# Patient Record
Sex: MALE | Race: WHITE | Employment: FULL TIME | URBAN - METROPOLITAN AREA
[De-identification: names, ages, dates, MRNs, and addresses within clinical notes are randomized per-mention and may not be internally consistent; named-entity substitution may affect disease eponyms.]

---

## 2017-01-17 RX ORDER — ATENOLOL 50 MG/1
TABLET ORAL
Qty: 14 TABLET | Refills: 0 | Status: SHIPPED | OUTPATIENT
Start: 2017-01-17

## 2017-11-02 LAB
AVERAGE GLUCOSE: 153
HBA1C MFR BLD: 8.5 %
TSH SERPL DL<=0.05 MIU/L-ACNC: 0.78 UIU/ML

## 2017-11-06 ENCOUNTER — OFFICE VISIT (OUTPATIENT)
Dept: FAMILY MEDICINE CLINIC | Age: 47
End: 2017-11-06
Payer: COMMERCIAL

## 2017-11-06 VITALS
TEMPERATURE: 98.5 F | BODY MASS INDEX: 34.02 KG/M2 | HEIGHT: 71 IN | SYSTOLIC BLOOD PRESSURE: 128 MMHG | OXYGEN SATURATION: 98 % | DIASTOLIC BLOOD PRESSURE: 74 MMHG | HEART RATE: 77 BPM | WEIGHT: 243 LBS

## 2017-11-06 DIAGNOSIS — R21 RASH AND NONSPECIFIC SKIN ERUPTION: ICD-10-CM

## 2017-11-06 DIAGNOSIS — T14.8XXA ABRASION OF SKIN: Primary | ICD-10-CM

## 2017-11-06 DIAGNOSIS — E11.9 TYPE 2 DIABETES MELLITUS WITHOUT COMPLICATION, WITHOUT LONG-TERM CURRENT USE OF INSULIN (HCC): ICD-10-CM

## 2017-11-06 DIAGNOSIS — L03.114 CELLULITIS OF LEFT UPPER EXTREMITY: ICD-10-CM

## 2017-11-06 LAB
CREATININE URINE: 112.85 MG/DL
MICROALBUMIN/CREAT 24H UR: <0.7 MG/G{CREAT}

## 2017-11-06 PROCEDURE — 99214 OFFICE O/P EST MOD 30 MIN: CPT | Performed by: NURSE PRACTITIONER

## 2017-11-06 RX ORDER — DOXYCYCLINE HYCLATE 100 MG
100 TABLET ORAL 2 TIMES DAILY
Qty: 20 TABLET | Refills: 0 | Status: SHIPPED | OUTPATIENT
Start: 2017-11-06 | End: 2017-11-16

## 2017-11-06 RX ORDER — HYDROXYZINE HYDROCHLORIDE 25 MG/1
25 TABLET, FILM COATED ORAL 3 TIMES DAILY PRN
Qty: 30 TABLET | Refills: 0 | Status: SHIPPED | OUTPATIENT
Start: 2017-11-06 | End: 2017-11-16

## 2017-11-06 ASSESSMENT — PATIENT HEALTH QUESTIONNAIRE - PHQ9
2. FEELING DOWN, DEPRESSED OR HOPELESS: 0
SUM OF ALL RESPONSES TO PHQ9 QUESTIONS 1 & 2: 0
1. LITTLE INTEREST OR PLEASURE IN DOING THINGS: 0
SUM OF ALL RESPONSES TO PHQ QUESTIONS 1-9: 0

## 2017-11-06 NOTE — PROGRESS NOTES
Subjective:      Patient ID: Chloe Stearns is a 52 y.o. male. HPI  9year old white male presents with followin. Abrasion /cellulitis- left hand for 2 days. Was working on a machine Saturday and injured left hand. Has an abrasion in left hand which is getting more painful, red and swelling over the past 2 days. Td is current. 2. DM- currently is on insulin managed by Dr. Mary Ho and recent A1c was over 8. Denies paresthesias sores ulcers in BLEs. 3. Rash- in bilateral thighs for 2 weeks. it is very itchy. Pt states he hasn't changed soaps detergents or skin care products. denies recent fever chills cough or nasal congestion. The only thing he could think of is that he started new shorts recently. Review of Systems   Constitutional: Negative for chills and fever. Respiratory: Negative for cough and shortness of breath. Cardiovascular: Negative for chest pain. Gastrointestinal: Negative for abdominal pain and nausea. Skin: Positive for rash (in thighs) and wound (in left hand). Neurological: Negative for dizziness, weakness and numbness. Objective:   Physical Exam   Constitutional: He appears well-nourished. No distress. obesity   HENT:   Nose: Nose normal.   Mouth/Throat: Oropharynx is clear and moist.   Eyes: Conjunctivae are normal.   Neck: Neck supple. Cardiovascular: Normal rate, regular rhythm and normal heart sounds. Pulmonary/Chest: Effort normal and breath sounds normal. No respiratory distress. Abdominal: Soft. There is no tenderness. Musculoskeletal: He exhibits no edema or tenderness. Lymphadenopathy:     He has no cervical adenopathy. Neurological: He is alert. No cranial nerve deficit. Foot exam: no sores ulcers in bilateral feet. Monofilament test normal bilaterally. Skin:        Psychiatric: He has a normal mood and affect. His behavior is normal.   Nursing note and vitals reviewed. Assessment:      1. Abrasion of skin    2. Cellulitis of left upper extremity    3. Type 2 diabetes mellitus without complication, without long-term current use of insulin ([facility] [address]    4. Rash and nonspecific skin eruption            Plan:      BP Readings from Last 3 Encounters:   11/06/17 128/74   06/21/16 118/72   01/30/15 (!) 140/108     /74 (Site: Right Arm, Position: Sitting, Cuff Size: Large Adult)   Pulse 77   Temp 98.5 °F (36.9 °C) (Oral)   Ht 5' 11\" (1.803 m)   Wt 243 lb (110.2 kg)   SpO2 98%   BMI 33.89 kg/m²   Lab Results   Component Value Date    CHOL 196 01/31/2015    TRIG 165 01/31/2015    HDL 29 (A) 01/31/2015    LABA1C 8.3 (H) 12/16/2011     No results found for: CALCIUM, PHOS  Lab Results   Component Value Date    LDLCALC 134 01/31/2015         1. Abrasion of skin/2. Cellulitis of left upper extremity  - doxycycline hyclate (VIBRA-TABS) 100 MG tablet; Take 1 tablet by mouth 2 times daily for 10 days  Dispense: 20 tablet; Refill: 0  - wound care instructions provided. Enc to call the office if symptoms worsen. 3. Type 2 diabetes mellitus without complication, without long-term current use of insulin ([address]  - obtain A1c result from Endo  - Microalbumin, Ur; Future  -  DIABETES FOOT EXAM    4. Rash and nonspecific skin eruption - likely allergic to shorts  - hydrOXYzine (ATARAX) 25 MG tablet; Take 1 tablet by mouth 3 times daily as needed for Itching  Dispense: 30 tablet; Refill: 0  - advised to wear shorts made of cotton. - follow up in 2 weeks if rash is not improving. Requested Prescriptions     Signed Prescriptions Disp Refills    doxycycline hyclate (VIBRA-TABS) 100 MG tablet 20 tablet 0     Sig: Take 1 tablet by mouth 2 times daily for 10 days    hydrOXYzine (ATARAX) 25 MG tablet 30 tablet 0     Sig: Take 1 tablet by mouth 3 times daily as needed for Itching       There are no discontinued medications.     Diana Lombardi received counseling on the following healthy behaviors: nutrition, exercise and weight reduction  Reviewed prior labs and health maintenance. Continue current medications, diet and exercise. Discussed use, benefit, and side effects of prescribed medications. Barriers to medication compliance addressed. Patient given educational materials - see patient instructions. All patient questions answered. Patient voiced understanding.

## 2017-11-07 DIAGNOSIS — E03.9 HYPOTHYROIDISM, UNSPECIFIED TYPE: ICD-10-CM

## 2017-11-07 DIAGNOSIS — E11.9 TYPE 2 DIABETES MELLITUS WITHOUT COMPLICATION, WITHOUT LONG-TERM CURRENT USE OF INSULIN (HCC): Primary | ICD-10-CM

## 2017-11-07 DIAGNOSIS — E11.9 TYPE 2 DIABETES MELLITUS WITHOUT COMPLICATION, WITHOUT LONG-TERM CURRENT USE OF INSULIN (HCC): ICD-10-CM

## 2017-11-07 ASSESSMENT — ENCOUNTER SYMPTOMS
ABDOMINAL PAIN: 0
NAUSEA: 0
COUGH: 0
SHORTNESS OF BREATH: 0

## 2017-12-28 ENCOUNTER — OFFICE VISIT (OUTPATIENT)
Dept: FAMILY MEDICINE CLINIC | Age: 47
End: 2017-12-28
Payer: COMMERCIAL

## 2017-12-28 VITALS
DIASTOLIC BLOOD PRESSURE: 83 MMHG | OXYGEN SATURATION: 97 % | BODY MASS INDEX: 35.62 KG/M2 | TEMPERATURE: 100.9 F | WEIGHT: 263 LBS | SYSTOLIC BLOOD PRESSURE: 127 MMHG | HEIGHT: 72 IN | HEART RATE: 99 BPM

## 2017-12-28 DIAGNOSIS — J01.90 ACUTE SINUSITIS, RECURRENCE NOT SPECIFIED, UNSPECIFIED LOCATION: Primary | ICD-10-CM

## 2017-12-28 DIAGNOSIS — J20.9 ACUTE BRONCHITIS, UNSPECIFIED ORGANISM: ICD-10-CM

## 2017-12-28 DIAGNOSIS — J45.21 MILD INTERMITTENT REACTIVE AIRWAY DISEASE WITH ACUTE EXACERBATION: ICD-10-CM

## 2017-12-28 LAB
INFLUENZA A ANTIBODY: NEGATIVE
INFLUENZA B ANTIBODY: NEGATIVE

## 2017-12-28 PROCEDURE — 87804 INFLUENZA ASSAY W/OPTIC: CPT | Performed by: FAMILY MEDICINE

## 2017-12-28 PROCEDURE — 99213 OFFICE O/P EST LOW 20 MIN: CPT | Performed by: FAMILY MEDICINE

## 2017-12-28 RX ORDER — AMOXICILLIN AND CLAVULANATE POTASSIUM 875; 125 MG/1; MG/1
1 TABLET, FILM COATED ORAL 2 TIMES DAILY
Qty: 20 TABLET | Refills: 0 | Status: SHIPPED | OUTPATIENT
Start: 2017-12-28 | End: 2018-01-07

## 2017-12-28 RX ORDER — PREDNISONE 20 MG/1
20 TABLET ORAL 2 TIMES DAILY
Qty: 10 TABLET | Refills: 0 | Status: SHIPPED | OUTPATIENT
Start: 2017-12-28 | End: 2018-01-02

## 2017-12-28 RX ORDER — ALBUTEROL SULFATE 2.5 MG/3ML
2.5 SOLUTION RESPIRATORY (INHALATION) EVERY 6 HOURS PRN
Qty: 1 PACKAGE | Refills: 0 | Status: SHIPPED | OUTPATIENT
Start: 2017-12-28 | End: 2021-08-18

## 2017-12-28 RX ORDER — BENZONATATE 100 MG/1
100 CAPSULE ORAL 3 TIMES DAILY PRN
Qty: 25 CAPSULE | Refills: 0 | Status: SHIPPED | OUTPATIENT
Start: 2017-12-28 | End: 2018-01-22

## 2017-12-28 RX ORDER — VALSARTAN AND HYDROCHLOROTHIAZIDE 320; 12.5 MG/1; MG/1
1 TABLET, FILM COATED ORAL DAILY
COMMUNITY
Start: 2017-12-04 | End: 2021-01-25

## 2017-12-28 RX ORDER — ALBUTEROL SULFATE 90 UG/1
2 AEROSOL, METERED RESPIRATORY (INHALATION) EVERY 6 HOURS PRN
Qty: 1 INHALER | Refills: 0 | Status: SHIPPED | OUTPATIENT
Start: 2017-12-28 | End: 2021-08-18

## 2017-12-28 ASSESSMENT — ENCOUNTER SYMPTOMS
VOICE CHANGE: 0
SHORTNESS OF BREATH: 1
BACK PAIN: 0
RHINORRHEA: 1
CHEST TIGHTNESS: 0
HEMOPTYSIS: 0
SINUS PRESSURE: 1
VOMITING: 0
DIARRHEA: 1
ABDOMINAL PAIN: 0
SINUS PAIN: 1
NAUSEA: 0
TROUBLE SWALLOWING: 1
COUGH: 1
EYE REDNESS: 0
EYE DISCHARGE: 0
WHEEZING: 1
CONSTIPATION: 0
SORE THROAT: 1
EYE ITCHING: 0

## 2017-12-28 NOTE — PROGRESS NOTES
[facility]   [address] [facility]  [address] [facility] [address]  Dept: [phone]  Dept Fax: [phone]    Miguel Angel Funes is a 52 y.o. male who presents today for his medical conditions/complaints as noted below. Miguel Angel Funes is c/o of Cough (fever, body aches - started on Monday)        HPI:     Cough   This is a new problem. The current episode started in the past 7 days (5 days). The problem has been unchanged. The problem occurs every few minutes. The cough is productive of sputum. Associated symptoms include chills, ear pain, a fever, headaches, myalgias, nasal congestion, postnasal drip, rhinorrhea, a sore throat, shortness of breath, sweats and wheezing. Pertinent negatives include no chest pain, eye redness, hemoptysis or rash. Associated symptoms comments: Ribs hurt from coughing. Sinus headaches. . The symptoms are aggravated by lying down. Treatments tried: nyquil mucinex robitussin. The treatment provided mild relief. His past medical history is significant for environmental allergies. There is no history of asthma, bronchitis or pneumonia.  RAD       Past Medical History:   Diagnosis Date    Anxiety 6/6/2013    Depression 6/6/2013    DM (diabetes mellitus) ([facility] [address] 6/6/2013    ED (erectile dysfunction) 6/6/2013    Elevated ALT measurement 6/6/2013    GERD (gastroesophageal reflux disease) 6/6/2013    HTN (hypertension) 6/6/2013    Hyperlipidemia 6/6/2013    Hypothyroidism 6/6/2013    Obesity 6/6/2013    RAD (reactive airway disease) 6/6/2013      Past Surgical History:   Procedure Laterality Date    CARDIAC CATHETERIZATION      TONSILLECTOMY         Family History   Problem Relation Age of Onset    Diabetes Other     Heart Disease Other        Social History   Substance Use Topics    Smoking status: Never Smoker    Smokeless tobacco: Never Used    Alcohol use Yes      Comment: rarely      Current Outpatient Prescriptions   Medication Sig Dispense Refill    valsartan-hydrochlorothiazide (DIOVAN-HCT) 320-12.5 MG per tablet Take 1 tablet by mouth daily      amoxicillin-clavulanate (AUGMENTIN) 875-125 MG per tablet Take 1 tablet by mouth 2 times daily for 10 days 20 tablet 0    albuterol sulfate  (90 Base) MCG/ACT inhaler Inhale 2 puffs into the lungs every 6 hours as needed for Wheezing 1 Inhaler 0    albuterol (PROVENTIL) (2.5 MG/3ML) 0.083% nebulizer solution Take 3 mLs by nebulization every 6 hours as needed for Wheezing 1 Package 0    predniSONE (DELTASONE) 20 MG tablet Take 1 tablet by mouth 2 times daily for 5 days Take one tablet twice a day with food for 5 days 10 tablet 0    benzonatate (TESSALON PERLES) 100 MG capsule Take 1 capsule by mouth 3 times daily as needed for Cough 25 capsule 0    atenolol (TENORMIN) 50 MG tablet TAKE ONE TABLET BY MOUTH DAILY 14 tablet 0    Insulin Glargine (LANTUS SC) Inject  into the skin. Endo. Dr Anisha Champagne      insulin lispro (HUMALOG) 100 UNIT/ML injection Inject  into the skin 3 times daily (before meals). Endo      metformin (GLUCOPHAGE) 1000 MG tablet Take 1,000 mg by mouth 2 times daily (with meals).  levothyroxine (SYNTHROID) 200 MCG tablet Take 200 mcg by mouth Daily.  ibuprofen (ADVIL;MOTRIN) 800 MG tablet Take 1 tablet by mouth 3 times daily as needed for Pain (Take with food) 30 tablet 0    lovastatin (MEVACOR) 40 MG tablet       B-D UF III MINI PEN NEEDLES 31G X 5 MM MISC       FARXIGA 10 MG tablet       valsartan (DIOVAN) 320 MG tablet Take 320 mg by mouth daily. No current facility-administered medications for this visit.       Allergies   Allergen Reactions    Antihistamines, Loratadine-Type [Antihistamines, Loratadine-Type]     Fenofibrate     Quinapril Hcl        Health Maintenance   Topic Date Due    HIV screen  03/26/1985    Pneumococcal med risk (1 of 1 - PPSV23) 03/26/1989    Lipid screen  01/31/2016    Flu vaccine (1) 09/01/2017    Diabetic retinal exam  09/20/2018    Diabetic hemoglobin A1C test  11/02/2018    Diabetic foot exam  11/06/2018    Diabetic microalbuminuria test  11/06/2018    DTaP/Tdap/Td vaccine (2 - Td) 07/28/2024       Subjective:      Review of Systems   Constitutional: Positive for appetite change, chills, diaphoresis, fatigue and fever. Negative for activity change. HENT: Positive for congestion, ear pain, postnasal drip, rhinorrhea, sinus pain, sinus pressure, sneezing, sore throat and trouble swallowing. Negative for ear discharge, hearing loss and voice change. Eyes: Negative for discharge, redness, itching and visual disturbance. Respiratory: Positive for cough, shortness of breath and wheezing. Negative for hemoptysis and chest tightness. Cardiovascular: Negative for chest pain. Gastrointestinal: Positive for diarrhea. Negative for abdominal pain, constipation, nausea and vomiting. Mild diarrhea   Genitourinary: Negative for dysuria, flank pain and frequency. Urine darker than usual.   Musculoskeletal: Positive for myalgias. Negative for back pain, neck pain and neck stiffness. Skin: Negative for rash. Allergic/Immunologic: Positive for environmental allergies. Neurological: Positive for headaches. Negative for dizziness, weakness and light-headedness. Hematological: Negative for adenopathy. Psychiatric/Behavioral: The patient is not nervous/anxious. Objective:     Physical Exam   Constitutional: He is oriented to person, place, and time. He appears well-developed and well-nourished. No distress. HENT:   Head: Normocephalic. Right Ear: External ear normal. Tympanic membrane is erythematous. A middle ear effusion is present. Left Ear: External ear normal. A middle ear effusion is present. Nose: Mucosal edema and rhinorrhea present. Mouth/Throat: Uvula is midline and mucous membranes are normal. No uvula swelling. Posterior oropharyngeal erythema present.  No

## 2017-12-28 NOTE — PATIENT INSTRUCTIONS
Patient Education        Sinusitis: Care Instructions  Your Care Instructions    Sinusitis is an infection of the lining of the sinus cavities in your head. Sinusitis often follows a cold. It causes pain and pressure in your head and face. In most cases, sinusitis gets better on its own in 1 to 2 weeks. But some mild symptoms may last for several weeks. Sometimes antibiotics are needed. Follow-up care is a key part of your treatment and safety. Be sure to make and go to all appointments, and call your doctor if you are having problems. It's also a good idea to know your test results and keep a list of the medicines you take. How can you care for yourself at home? · Take an over-the-counter pain medicine, such as acetaminophen (Tylenol), ibuprofen (Advil, Motrin), or naproxen (Aleve). Read and follow all instructions on the label. · If the doctor prescribed antibiotics, take them as directed. Do not stop taking them just because you feel better. You need to take the full course of antibiotics. · Be careful when taking over-the-counter cold or flu medicines and Tylenol at the same time. Many of these medicines have acetaminophen, which is Tylenol. Read the labels to make sure that you are not taking more than the recommended dose. Too much acetaminophen (Tylenol) can be harmful. · Breathe warm, moist air from a steamy shower, a hot bath, or a sink filled with hot water. Avoid cold, dry air. Using a humidifier in your home may help. Follow the directions for cleaning the machine. · Use saline (saltwater) nasal washes to help keep your nasal passages open and wash out mucus and bacteria. You can buy saline nose drops at a grocery store or drugstore. Or you can make your own at home by adding 1 teaspoon of salt and 1 teaspoon of baking soda to 2 cups of distilled water. If you make your own, fill a bulb syringe with the solution, insert the tip into your nostril, and squeeze gently. Liz Sine your nose.   · Put a hot, wet towel or a warm gel pack on your face 3 or 4 times a day for 5 to 10 minutes each time. · Try a decongestant nasal spray like oxymetazoline (Afrin). Do not use it for more than 3 days in a row. Using it for more than 3 days can make your congestion worse. When should you call for help? Call your doctor now or seek immediate medical care if:  ? · You have new or worse swelling or redness in your face or around your eyes. ? · You have a new or higher fever. ? Watch closely for changes in your health, and be sure to contact your doctor if:  ? · You have new or worse facial pain. ? · The mucus from your nose becomes thicker (like pus) or has new blood in it. ? · You are not getting better as expected. Where can you learn more? Go to https://simpleFLOORSpepiceweb.Jamn. org and sign in to your Marriage.com account. Enter D669 in the Go800 box to learn more about \"Sinusitis: Care Instructions. \"     If you do not have an account, please click on the \"Sign Up Now\" link. Current as of: May 12, 2017  Content Version: 11.4  © 9267-5845 [employer]. Care instructions adapted under license by [facility] ([facility]). If you have questions about a medical condition or this instruction, always ask your healthcare professional. [place] [address] any warranty or liability for your use of this information. Patient Education        Bronchitis: Care Instructions  Your Care Instructions    Bronchitis is inflammation of the bronchial tubes, which carry air to the lungs. The tubes swell and produce mucus, or phlegm. The mucus and inflamed bronchial tubes make you cough. You may have trouble breathing. Most cases of bronchitis are caused by viruses like those that cause colds. Antibiotics usually do not help and they may be harmful. Bronchitis usually develops rapidly and lasts about 2 to 3 weeks in otherwise healthy people.   Follow-up care is a key part of your treatment and safety. Be sure to make and go to all appointments, and call your doctor if you are having problems. It's also a good idea to know your test results and keep a list of the medicines you take. How can you care for yourself at home? · Take all medicines exactly as prescribed. Call your doctor if you think you are having a problem with your medicine. · Get some extra rest.  · Take an over-the-counter pain medicine, such as acetaminophen (Tylenol), ibuprofen (Advil, Motrin), or naproxen (Aleve) to reduce fever and relieve body aches. Read and follow all instructions on the label. · Do not take two or more pain medicines at the same time unless the doctor told you to. Many pain medicines have acetaminophen, which is Tylenol. Too much acetaminophen (Tylenol) can be harmful. · Take an over-the-counter cough medicine that contains dextromethorphan to help quiet a dry, hacking cough so that you can sleep. Avoid cough medicines that have more than one active ingredient. Read and follow all instructions on the label. · Breathe moist air from a humidifier, hot shower, or sink filled with hot water. The heat and moisture will thin mucus so you can cough it out. · Do not smoke. Smoking can make bronchitis worse. If you need help quitting, talk to your doctor about stop-smoking programs and medicines. These can increase your chances of quitting for good. When should you call for help? Call 911 anytime you think you may need emergency care. For example, call if:  ? · You have severe trouble breathing. ?Call your doctor now or seek immediate medical care if:  ? · You have new or worse trouble breathing. ? · You cough up dark brown or bloody mucus (sputum). ? · You have a new or higher fever. ? · You have a new rash. ? Watch closely for changes in your health, and be sure to contact your doctor if:  ? · You cough more deeply or more often, especially if you notice more mucus or a change in the color of your mucus. ? · You are not getting better as expected. Where can you learn more? Go to https://chpepiceweb.NanoConversion Technologies. org and sign in to your Elastifile account. Enter H333 in the [employer] box to learn more about \"Bronchitis: Care Instructions. \"     If you do not have an account, please click on the \"Sign Up Now\" link. Current as of: May 12, 2017  Content Version: 11.4  © 2823-3053 [employer]. Care instructions adapted under license by [facility] ([facility]). If you have questions about a medical condition or this instruction, always ask your healthcare professional. [address] any warranty or liability for your use of this information. Patient Education        Saline Nasal Washes: Care Instructions  Your Care Instructions  Saline nasal washes help keep the nasal passages open by washing out thick or dried mucus. This simple remedy can help relieve symptoms of allergies, sinusitis, and colds. It also can make the nose feel more comfortable by keeping the mucous membranes moist. You may notice a little burning sensation in your nose the first few times you use the solution, but this usually gets better in a few days. Follow-up care is a key part of your treatment and safety. Be sure to make and go to all appointments, and call your doctor if you are having problems. It's also a good idea to know your test results and keep a list of the medicines you take. How can you care for yourself at home? · You can buy premixed saline solution in a squeeze bottle or other sinus rinse products at a drugstore. Read and follow the instructions on the label. · You also can make your own saline solution by adding 1 teaspoon of salt and 1 teaspoon of baking soda to 2 cups of distilled water. · If you use a homemade solution, pour a small amount into a clean bowl. Using a rubber bulb syringe, squeeze the syringe and place the tip in the salt water.  Pull a small amount of the salt water into the syringe by relaxing your hand. · Sit down with your head tilted slightly back. Do not lie down. Put the tip of the bulb syringe or the squeeze bottle a little way into one of your nostrils. Gently drip or squirt a few drops into the nostril. Repeat with the other nostril. Some sneezing and gagging are normal at first.  · Gently blow your nose. · Wipe the syringe or bottle tip clean after each use. · Repeat this 2 or 3 times a day. · Use nasal washes gently if you have nosebleeds often. When should you call for help? Watch closely for changes in your health, and be sure to contact your doctor if:  ? · You often get nosebleeds. ? · You have problems doing the nasal washes. Where can you learn more? Go to https://chpejodieweb.Thucy. org and sign in to your Lingoda account. Enter [phone] [id number] [phone] in the thephotocloser.com box to learn more about \"Saline Nasal Washes: Care Instructions. \"     If you do not have an account, please click on the \"Sign Up Now\" link. Current as of: May 12, 2017  Content Version: 11.4  © 0736-9414 [employer]o. Care instructions adapted under license by [facility] [facility] ([facility]). If you have questions about a medical condition or this instruction, always ask your healthcare professional. [place] [address] any warranty or liability for your use of this information. Patient Education        Wheezing or Bronchoconstriction: Care Instructions  Your Care Instructions  Wheezing is a whistling noise made during breathing. It occurs when the small airways, or bronchial tubes, that lead to your lungs swell or contract (spasm) and become narrow. This narrowing is called bronchoconstriction. When your airways constrict, it is hard for air to pass through and this makes it hard for you to breathe. Wheezing and bronchoconstriction can be caused by many problems, including:  · An infection such as the flu or a cold.   · Allergies such as hay

## 2021-01-25 ENCOUNTER — OFFICE VISIT (OUTPATIENT)
Dept: PRIMARY CARE CLINIC | Age: 51
End: 2021-01-25
Payer: COMMERCIAL

## 2021-01-25 VITALS
BODY MASS INDEX: 36.08 KG/M2 | SYSTOLIC BLOOD PRESSURE: 142 MMHG | WEIGHT: 266 LBS | DIASTOLIC BLOOD PRESSURE: 84 MMHG | OXYGEN SATURATION: 98 % | HEART RATE: 87 BPM

## 2021-01-25 DIAGNOSIS — E03.9 HYPOTHYROIDISM, UNSPECIFIED TYPE: ICD-10-CM

## 2021-01-25 DIAGNOSIS — Z13.220 LIPID SCREENING: ICD-10-CM

## 2021-01-25 DIAGNOSIS — Z00.00 HEALTH CARE MAINTENANCE: ICD-10-CM

## 2021-01-25 DIAGNOSIS — R10.9 FLANK PAIN: ICD-10-CM

## 2021-01-25 DIAGNOSIS — Z12.11 SCREENING FOR COLON CANCER: Primary | ICD-10-CM

## 2021-01-25 DIAGNOSIS — E11.9 CONTROLLED TYPE 2 DIABETES MELLITUS WITHOUT COMPLICATION, WITH LONG-TERM CURRENT USE OF INSULIN (HCC): ICD-10-CM

## 2021-01-25 DIAGNOSIS — Z79.4 CONTROLLED TYPE 2 DIABETES MELLITUS WITHOUT COMPLICATION, WITH LONG-TERM CURRENT USE OF INSULIN (HCC): ICD-10-CM

## 2021-01-25 DIAGNOSIS — Z12.5 SCREENING PSA (PROSTATE SPECIFIC ANTIGEN): ICD-10-CM

## 2021-01-25 DIAGNOSIS — I10 ESSENTIAL (PRIMARY) HYPERTENSION: ICD-10-CM

## 2021-01-25 LAB
BILIRUBIN, POC: NEGATIVE
BLOOD URINE, POC: NEGATIVE
CLARITY, POC: CLEAR
COLOR, POC: YELLOW
CREATININE URINE POCT: NORMAL
GLUCOSE URINE, POC: NEGATIVE
HBA1C MFR BLD: 6.9 %
KETONES, POC: NORMAL
LEUKOCYTE EST, POC: NEGATIVE
MICROALBUMIN/CREAT 24H UR: NORMAL MG/G{CREAT}
MICROALBUMIN/CREAT UR-RTO: NORMAL
NITRITE, POC: NEGATIVE
PH, POC: 5
PROTEIN, POC: NEGATIVE
SPECIFIC GRAVITY, POC: 1.02
UROBILINOGEN, POC: 0.2

## 2021-01-25 PROCEDURE — 83036 HEMOGLOBIN GLYCOSYLATED A1C: CPT | Performed by: PHYSICIAN ASSISTANT

## 2021-01-25 PROCEDURE — 82044 UR ALBUMIN SEMIQUANTITATIVE: CPT | Performed by: PHYSICIAN ASSISTANT

## 2021-01-25 PROCEDURE — 81003 URINALYSIS AUTO W/O SCOPE: CPT | Performed by: PHYSICIAN ASSISTANT

## 2021-01-25 PROCEDURE — 99204 OFFICE O/P NEW MOD 45 MIN: CPT | Performed by: PHYSICIAN ASSISTANT

## 2021-01-25 ASSESSMENT — ENCOUNTER SYMPTOMS
CHEST TIGHTNESS: 0
PHOTOPHOBIA: 0
RHINORRHEA: 0
SINUS PRESSURE: 0
VOMITING: 0
DIARRHEA: 0
EYE DISCHARGE: 0
ABDOMINAL DISTENTION: 0
ABDOMINAL PAIN: 0
COUGH: 0
SORE THROAT: 0
CONSTIPATION: 0
SHORTNESS OF BREATH: 0

## 2021-01-25 NOTE — PROGRESS NOTES
[facility]  [address] [address]  [address] [address]  Dept: [phone]    Anaya Walters is a 48 y.o. male who presents today for his medical conditions/complaintsas noted below. Chief Complaint   Patient presents with    New Patient     Get established     Flank Pain     right side flank pain x3 weeks. Pt states its worse when he lays down        HPI:     HPI : The patient is a pleasant 59-year-old male who presents to establish care. The patient is here for a diabetic visit and also has concerns of flank pain. He has been to Dr. Bobby Lozano for his diabetic checks. He has checked sugars daily running below 140. Patient is not a fan of taking medication. Seeing Assenmacher for rotator cuff tear. Now having flank pain. No urinary concerns but is having some flank pain. States it feels like a rib. Some movements or rolling onto that side at night send a sharp pain. He was seeing chiropractor for this however has not been in over a month now. No flu shot.      LDL Calculated (mg/dL)   Date Value   01/31/2015 134       (goal LDL is <100)   No results found for: AST, ALT, BUN  BP Readings from Last 3 Encounters:   01/25/21 (!) 142/84   12/28/17 127/83   11/06/17 128/74          (goal 120/80)    Past Medical History:   Diagnosis Date    Anxiety 6/6/2013    Depression 6/6/2013    DM (diabetes mellitus) ([facility] [address] 6/6/2013    ED (erectile dysfunction) 6/6/2013    Elevated ALT measurement 6/6/2013    GERD (gastroesophageal reflux disease) 6/6/2013    HTN (hypertension) 6/6/2013    Hyperlipidemia 6/6/2013    Hypothyroidism 6/6/2013    Obesity 6/6/2013    RAD (reactive airway disease) 6/6/2013      Past Surgical History:   Procedure Laterality Date    CARDIAC CATHETERIZATION      TONSILLECTOMY         Family History   Problem Relation Age of Onset    Diabetes Other     Heart Disease Other     Heart Disease Mother     Heart Disease Father        Social History     Tobacco Use    Smoking status: Never Smoker    Smokeless tobacco: Never Used   Substance Use Topics    Alcohol use: Yes     Comment: rarely      Current Outpatient Medications   Medication Sig Dispense Refill    atenolol (TENORMIN) 50 MG tablet TAKE ONE TABLET BY MOUTH DAILY 14 tablet 0    B-D UF III MINI PEN NEEDLES 31G X 5 MM MISC       Insulin Glargine (LANTUS SC) Inject  into the skin. Endo. Dr Geoff Kathleen      metformin (GLUCOPHAGE) 1000 MG tablet Take 1,000 mg by mouth 2 times daily (with meals).  levothyroxine (SYNTHROID) 200 MCG tablet Take 200 mcg by mouth Daily.  albuterol sulfate  (90 Base) MCG/ACT inhaler Inhale 2 puffs into the lungs every 6 hours as needed for Wheezing (Patient not taking: Reported on 1/25/2021) 1 Inhaler 0    albuterol (PROVENTIL) (2.5 MG/3ML) 0.083% nebulizer solution Take 3 mLs by nebulization every 6 hours as needed for Wheezing (Patient not taking: Reported on 1/25/2021) 1 Package 0    ibuprofen (ADVIL;MOTRIN) 800 MG tablet Take 1 tablet by mouth 3 times daily as needed for Pain (Take with food) (Patient not taking: Reported on 1/25/2021) 30 tablet 0     No current facility-administered medications for this visit.       Allergies   Allergen Reactions    Antihistamines, Loratadine-Type [Antihistamines, Loratadine-Type]     Fenofibrate     Quinapril Hcl        Health Maintenance   Topic Date Due    Hepatitis C screen  1970    Pneumococcal 0-64 years Vaccine (1 of 1 - PPSV23) 03/26/1976    HIV screen  03/26/1985    Hepatitis B vaccine (1 of 3 - Risk 3-dose series) 03/26/1989    Lipid screen  01/31/2016    Potassium monitoring  01/31/2016    Creatinine monitoring  01/31/2016    TSH testing  11/02/2018    Shingles Vaccine (1 of 2) 03/26/2020    Colon cancer screen colonoscopy  03/26/2020    Diabetic retinal exam  03/27/2020    Flu vaccine (1) 09/01/2020    Diabetic foot exam  01/25/2022    A1C test (Diabetic or Prediabetic)  01/25/2022    Diabetic microalbuminuria test  01/25/2022    DTaP/Tdap/Td vaccine (2 - Td) 07/28/2024    Hepatitis A vaccine  Aged Out    Hib vaccine  Aged Out    Meningococcal (ACWY) vaccine  Aged Out       Subjective:      Review of Systems   Constitutional: Negative for chills, fever and unexpected weight change. HENT: Negative for congestion, hearing loss, rhinorrhea, sinus pressure and sore throat. Eyes: Negative for photophobia, discharge and visual disturbance. Respiratory: Negative for cough, chest tightness and shortness of breath. Cardiovascular: Negative for chest pain, palpitations and leg swelling. Gastrointestinal: Negative for abdominal distention, abdominal pain, constipation, diarrhea and vomiting. Endocrine: Negative for polydipsia and polyuria. Genitourinary: Negative for decreased urine volume, difficulty urinating, frequency and urgency. Musculoskeletal: Negative for arthralgias, gait problem and myalgias. Skin: Negative for rash. Allergic/Immunologic: Negative for food allergies. Neurological: Negative for dizziness, weakness, numbness and headaches. Hematological: Negative for adenopathy. Psychiatric/Behavioral: Negative for dysphoric mood and sleep disturbance. The patient is not nervous/anxious. Objective:     BP (!) 142/84   Pulse 87   Wt 266 lb (120.7 kg)   SpO2 98%   BMI 36.08 kg/m²   Physical Exam  Constitutional:       General: He is not in acute distress. Appearance: Normal appearance. He is not ill-appearing. HENT:      Head: Normocephalic and atraumatic. Right Ear: External ear normal.      Left Ear: External ear normal.      Nose: Nose normal.      Mouth/Throat:      Mouth: Mucous membranes are moist.   Eyes:      Extraocular Movements: Extraocular movements intact. Conjunctiva/sclera: Conjunctivae normal.      Pupils: Pupils are equal, round, and reactive to light.    Neck:      Musculoskeletal: Normal range of prescribed medications. All patient questions answered. Pt voiced understanding. Reviewed health maintenance. Instructed to continue current medications, diet andexercise. Patient agreed with treatment plan. Follow up as directed.      Electronicallysigned by JANIS Lopez on 1/25/2021 at 11:24 AM

## 2021-01-28 ENCOUNTER — HOSPITAL ENCOUNTER (OUTPATIENT)
Age: 51
Setting detail: SPECIMEN
Discharge: HOME | End: 2021-01-28
Payer: COMMERCIAL

## 2021-01-28 DIAGNOSIS — Z13.220 LIPID SCREENING: ICD-10-CM

## 2021-01-28 DIAGNOSIS — Z00.00 HEALTH CARE MAINTENANCE: ICD-10-CM

## 2021-01-28 DIAGNOSIS — E03.9 HYPOTHYROIDISM, UNSPECIFIED TYPE: ICD-10-CM

## 2021-01-28 DIAGNOSIS — Z12.5 SCREENING PSA (PROSTATE SPECIFIC ANTIGEN): ICD-10-CM

## 2021-01-28 LAB
ABSOLUTE EOS #: 0.23 K/UL (ref 0–0.44)
ABSOLUTE IMMATURE GRANULOCYTE: <0.03 K/UL (ref 0–0.3)
ABSOLUTE LYMPH #: 1.66 K/UL (ref 1.1–3.7)
ABSOLUTE MONO #: 0.41 K/UL (ref 0.1–1.2)
ALBUMIN SERPL-MCNC: 4 G/DL (ref 3.5–5.2)
ALBUMIN/GLOBULIN RATIO: 1.4 (ref 1–2.5)
ALP BLD-CCNC: 46 U/L (ref 40–129)
ALT SERPL-CCNC: 32 U/L (ref 5–41)
ANION GAP SERPL CALCULATED.3IONS-SCNC: 12 MMOL/L (ref 9–17)
AST SERPL-CCNC: 28 U/L
BASOPHILS # BLD: 1 % (ref 0–2)
BASOPHILS ABSOLUTE: 0.05 K/UL (ref 0–0.2)
BILIRUB SERPL-MCNC: 0.7 MG/DL (ref 0.3–1.2)
BUN BLDV-MCNC: 17 MG/DL (ref 6–20)
BUN/CREAT BLD: ABNORMAL (ref 9–20)
CALCIUM SERPL-MCNC: 9.2 MG/DL (ref 8.6–10.4)
CHLORIDE BLD-SCNC: 100 MMOL/L (ref 98–107)
CHOLESTEROL, FASTING: 201 MG/DL
CHOLESTEROL/HDL RATIO: 5.6
CO2: 29 MMOL/L (ref 20–31)
CREAT SERPL-MCNC: 0.76 MG/DL (ref 0.7–1.2)
DIFFERENTIAL TYPE: ABNORMAL
EOSINOPHILS RELATIVE PERCENT: 6 % (ref 1–4)
GFR AFRICAN AMERICAN: >60 ML/MIN
GFR NON-AFRICAN AMERICAN: >60 ML/MIN
GFR SERPL CREATININE-BSD FRML MDRD: ABNORMAL ML/MIN/{1.73_M2}
GFR SERPL CREATININE-BSD FRML MDRD: ABNORMAL ML/MIN/{1.73_M2}
GLUCOSE BLD-MCNC: 172 MG/DL (ref 70–99)
HCT VFR BLD CALC: 46.8 % (ref 40.7–50.3)
HDLC SERPL-MCNC: 36 MG/DL
HEMOGLOBIN: 15.9 G/DL (ref 13–17)
HIV AG/AB: NONREACTIVE
IMMATURE GRANULOCYTES: 1 %
LDL CHOLESTEROL: 96 MG/DL (ref 0–130)
LYMPHOCYTES # BLD: 41 % (ref 24–43)
MCH RBC QN AUTO: 29.4 PG (ref 25.2–33.5)
MCHC RBC AUTO-ENTMCNC: 34 G/DL (ref 28.4–34.8)
MCV RBC AUTO: 86.7 FL (ref 82.6–102.9)
MONOCYTES # BLD: 10 % (ref 3–12)
NRBC AUTOMATED: 0 PER 100 WBC
PDW BLD-RTO: 12.4 % (ref 11.8–14.4)
PLATELET # BLD: 125 K/UL (ref 138–453)
PLATELET ESTIMATE: ABNORMAL
PMV BLD AUTO: 10.4 FL (ref 8.1–13.5)
POTASSIUM SERPL-SCNC: 4.3 MMOL/L (ref 3.7–5.3)
RBC # BLD: 5.4 M/UL (ref 4.21–5.77)
RBC # BLD: ABNORMAL 10*6/UL
SEG NEUTROPHILS: 41 % (ref 36–65)
SEGMENTED NEUTROPHILS ABSOLUTE COUNT: 1.62 K/UL (ref 1.5–8.1)
SODIUM BLD-SCNC: 141 MMOL/L (ref 135–144)
TOTAL PROTEIN: 6.9 G/DL (ref 6.4–8.3)
TRIGLYCERIDE, FASTING: 345 MG/DL
TSH SERPL DL<=0.05 MIU/L-ACNC: 0.83 MIU/L (ref 0.3–5)
VLDLC SERPL CALC-MCNC: ABNORMAL MG/DL (ref 1–30)
WBC # BLD: 4 K/UL (ref 3.5–11.3)
WBC # BLD: ABNORMAL 10*3/UL

## 2021-01-29 LAB
HEPATITIS C ANTIBODY: NONREACTIVE
PROSTATE SPECIFIC ANTIGEN: 3.8 UG/L

## 2021-02-10 ENCOUNTER — TELEPHONE (OUTPATIENT)
Dept: PRIMARY CARE CLINIC | Age: 51
End: 2021-02-10

## 2021-02-23 ENCOUNTER — TELEPHONE (OUTPATIENT)
Dept: PRIMARY CARE CLINIC | Age: 51
End: 2021-02-23

## 2021-03-26 PROBLEM — N18.2 STAGE 2 CHRONIC KIDNEY DISEASE: Status: ACTIVE | Noted: 2021-03-26

## 2021-03-26 PROBLEM — E11.65 HYPERGLYCEMIA DUE TO TYPE 2 DIABETES MELLITUS (HCC): Status: ACTIVE | Noted: 2021-03-26

## 2021-08-18 ENCOUNTER — TELEPHONE (OUTPATIENT)
Dept: PRIMARY CARE CLINIC | Age: 51
End: 2021-08-18

## 2021-08-18 ENCOUNTER — OFFICE VISIT (OUTPATIENT)
Dept: PRIMARY CARE CLINIC | Age: 51
End: 2021-08-18
Payer: COMMERCIAL

## 2021-08-18 VITALS
WEIGHT: 261.6 LBS | DIASTOLIC BLOOD PRESSURE: 82 MMHG | HEIGHT: 72 IN | BODY MASS INDEX: 35.43 KG/M2 | SYSTOLIC BLOOD PRESSURE: 132 MMHG | HEART RATE: 62 BPM | OXYGEN SATURATION: 98 %

## 2021-08-18 DIAGNOSIS — M79.604 LOW BACK PAIN RADIATING TO RIGHT LOWER EXTREMITY: ICD-10-CM

## 2021-08-18 DIAGNOSIS — Z79.4 CONTROLLED TYPE 2 DIABETES MELLITUS WITHOUT COMPLICATION, WITH LONG-TERM CURRENT USE OF INSULIN (HCC): Primary | ICD-10-CM

## 2021-08-18 DIAGNOSIS — E11.9 TYPE 2 DIABETES MELLITUS WITHOUT COMPLICATION, WITH LONG-TERM CURRENT USE OF INSULIN (HCC): Primary | ICD-10-CM

## 2021-08-18 DIAGNOSIS — M54.31 RIGHT SCIATIC NERVE PAIN: ICD-10-CM

## 2021-08-18 DIAGNOSIS — M54.50 LOW BACK PAIN RADIATING TO RIGHT LOWER EXTREMITY: ICD-10-CM

## 2021-08-18 DIAGNOSIS — Z79.4 TYPE 2 DIABETES MELLITUS WITHOUT COMPLICATION, WITH LONG-TERM CURRENT USE OF INSULIN (HCC): Primary | ICD-10-CM

## 2021-08-18 DIAGNOSIS — E11.9 CONTROLLED TYPE 2 DIABETES MELLITUS WITHOUT COMPLICATION, WITH LONG-TERM CURRENT USE OF INSULIN (HCC): Primary | ICD-10-CM

## 2021-08-18 LAB — HBA1C MFR BLD: 9.1 %

## 2021-08-18 PROCEDURE — 83036 HEMOGLOBIN GLYCOSYLATED A1C: CPT | Performed by: PHYSICIAN ASSISTANT

## 2021-08-18 PROCEDURE — 99214 OFFICE O/P EST MOD 30 MIN: CPT | Performed by: PHYSICIAN ASSISTANT

## 2021-08-18 RX ORDER — PEN NEEDLE, DIABETIC 31 G X1/4"
1 NEEDLE, DISPOSABLE MISCELLANEOUS DAILY
Qty: 100 EACH | Refills: 3 | Status: SHIPPED | OUTPATIENT
Start: 2021-08-18

## 2021-08-18 RX ORDER — CYCLOBENZAPRINE HCL 10 MG
10 TABLET ORAL 3 TIMES DAILY PRN
Qty: 21 TABLET | Refills: 0 | Status: SHIPPED | OUTPATIENT
Start: 2021-08-18 | End: 2021-09-01 | Stop reason: SDUPTHER

## 2021-08-18 RX ORDER — PREDNISONE 20 MG/1
20 TABLET ORAL 2 TIMES DAILY
Qty: 10 TABLET | Refills: 0 | Status: SHIPPED | OUTPATIENT
Start: 2021-08-18 | End: 2021-08-23

## 2021-08-18 ASSESSMENT — ENCOUNTER SYMPTOMS
PHOTOPHOBIA: 0
ABDOMINAL DISTENTION: 0
COUGH: 0
CONSTIPATION: 0
ABDOMINAL PAIN: 0
SHORTNESS OF BREATH: 0
EYE DISCHARGE: 0
SINUS PRESSURE: 0
RHINORRHEA: 0
SORE THROAT: 0
CHEST TIGHTNESS: 0
DIARRHEA: 0
VOMITING: 0

## 2021-08-18 NOTE — TELEPHONE ENCOUNTER
Please advise nishi with [facility] that these orders have gone through multiple times this way and im not sure why they cannot do this. If they are unable to fill these then please give verbal order for test strips and lancets that insurance will cover. Enough for once daily testing.

## 2021-08-18 NOTE — PROGRESS NOTES
[facility]  [address] [address]  Dept: [phone]    Therese Plasencia is a 46 y.o. male Established patient, who presents today for his medical conditions/complaints as noted below. Chief Complaint   Patient presents with    Lower Back Pain     Right side only       HPI:     HPI: The patient was doing some hard work and twisted wrind hurt his back. He went to chiropractor and got an adjustment. Iced back. laid down to relax. After shower he was unable to walk well. Needs to Monday return to work. Will fill out FMLA for 6 months 1 occurrence per month and 3 days per occurrence. Patient is also diabetic. [facility] endocrinology however has not seen them in about a year.     Reviewed prior notes None  Reviewed previous Labs    LDL Cholesterol (mg/dL)   Date Value   01/28/2021 96     LDL Calculated (mg/dL)   Date Value   01/31/2015 134       (goal LDL is <100)   AST (U/L)   Date Value   01/28/2021 28     ALT (U/L)   Date Value   01/28/2021 32     BUN (mg/dL)   Date Value   01/28/2021 17     Hemoglobin A1C (%)   Date Value   08/18/2021 9.1     TSH (mIU/L)   Date Value   01/28/2021 0.83     BP Readings from Last 3 Encounters:   08/18/21 132/82   01/25/21 (!) 142/84   12/28/17 127/83          (goal 120/80)    Past Medical History:   Diagnosis Date    Anxiety 6/6/2013    Depression 6/6/2013    DM (diabetes mellitus) ([facility] [address] 6/6/2013    ED (erectile dysfunction) 6/6/2013    Elevated ALT measurement 6/6/2013    GERD (gastroesophageal reflux disease) 6/6/2013    HTN (hypertension) 6/6/2013    Hyperlipidemia 6/6/2013    Hypothyroidism 6/6/2013    Obesity 6/6/2013    RAD (reactive airway disease) 6/6/2013    Stage 2 chronic kidney disease 3/26/2021      Past Surgical History:   Procedure Laterality Date    CARDIAC CATHETERIZATION      TONSILLECTOMY         Family History   Problem Relation Age of Onset    Diabetes Other     Heart Disease Other     Heart Disease Mother     Heart Disease Father        Social History     Tobacco Use    Smoking status: Never Smoker    Smokeless tobacco: Never Used   Substance Use Topics    Alcohol use: Yes     Comment: rarely      Current Outpatient Medications   Medication Sig Dispense Refill    cyclobenzaprine (FLEXERIL) 10 MG tablet Take 1 tablet by mouth 3 times daily as needed for Muscle spasms 21 tablet 0    predniSONE (DELTASONE) 20 MG tablet Take 1 tablet by mouth 2 times daily for 5 days 10 tablet 0    blood glucose monitor kit and supplies Dispense sufficient amount for indicated testing frequency plus additional to accommodate PRN testing needs. Dispense all needed supplies to include: monitor, strips, lancing device, lancets, control solutions, alcohol swabs. 1 kit 0    Insulin Pen Needle (MEIJER PEN NEEDLES) 31G X 6 MM MISC 1 each by Does not apply route daily 100 each 3    atenolol (TENORMIN) 50 MG tablet TAKE ONE TABLET BY MOUTH DAILY 14 tablet 0    B-D UF III MINI PEN NEEDLES 31G X 5 MM MISC       Insulin Glargine (LANTUS SC) Inject  into the skin. Endo. Dr Ruben Wolf      metformin (GLUCOPHAGE) 1000 MG tablet Take 1,000 mg by mouth 2 times daily (with meals).  levothyroxine (SYNTHROID) 200 MCG tablet Take 200 mcg by mouth Daily. No current facility-administered medications for this visit.      Allergies   Allergen Reactions    Antihistamines, Loratadine-Type [Antihistamines, Loratadine-Type]     Fenofibrate     Quinapril Hcl        Health Maintenance   Topic Date Due    Pneumococcal 0-64 years Vaccine (1 of 2 - PPSV23) Never done    COVID-19 Vaccine (1) Never done    Hepatitis B vaccine (1 of 3 - Risk 3-dose series) Never done    Colon cancer screen colonoscopy  Never done    Shingles Vaccine (1 of 2) Never done    Diabetic retinal exam  03/27/2020    Flu vaccine (1) 09/01/2021    A1C test (Diabetic or Prediabetic)  11/18/2021    Diabetic foot exam 01/25/2022    Diabetic microalbuminuria test  01/25/2022    Lipid screen  01/28/2022    TSH testing  01/28/2022    Potassium monitoring  01/28/2022    Creatinine monitoring  01/28/2022    DTaP/Tdap/Td vaccine (2 - Td or Tdap) 07/28/2024    Hepatitis C screen  Completed    HIV screen  Completed    Hepatitis A vaccine  Aged Out    Hib vaccine  Aged Out    Meningococcal (ACWY) vaccine  Aged Out       Subjective:      Review of Systems   Constitutional: Negative for chills, fever and unexpected weight change. HENT: Negative for congestion, hearing loss, rhinorrhea, sinus pressure and sore throat. Eyes: Negative for photophobia, discharge and visual disturbance. Respiratory: Negative for cough, chest tightness and shortness of breath. Cardiovascular: Negative for chest pain, palpitations and leg swelling. Gastrointestinal: Negative for abdominal distention, abdominal pain, constipation, diarrhea and vomiting. Endocrine: Negative for polydipsia and polyuria. Genitourinary: Negative for decreased urine volume, difficulty urinating, frequency and urgency. Musculoskeletal: Negative for arthralgias, gait problem and myalgias. Skin: Negative for rash. Allergic/Immunologic: Negative for food allergies. Neurological: Negative for dizziness, weakness, numbness and headaches. Hematological: Negative for adenopathy. Psychiatric/Behavioral: Negative for dysphoric mood and sleep disturbance. The patient is not nervous/anxious. Objective:     /82   Pulse 62   Ht 6' (1.829 m)   Wt 261 lb 9.6 oz (118.7 kg)   SpO2 98%   BMI 35.48 kg/m²   Physical Exam  Constitutional:       General: He is not in acute distress. Appearance: Normal appearance. He is not ill-appearing. HENT:      Head: Normocephalic and atraumatic.       Right Ear: External ear normal.      Left Ear: External ear normal.      Nose: Nose normal.      Mouth/Throat:      Mouth: Mucous membranes are moist.   Eyes: Extraocular Movements: Extraocular movements intact. Conjunctiva/sclera: Conjunctivae normal.      Pupils: Pupils are equal, round, and reactive to light. Neck:      Vascular: No carotid bruit. Cardiovascular:      Rate and Rhythm: Normal rate and regular rhythm. Pulses: Normal pulses. Heart sounds: Normal heart sounds. Pulmonary:      Effort: Pulmonary effort is normal. No respiratory distress. Breath sounds: Normal breath sounds. Abdominal:      General: Bowel sounds are normal. There is no distension. Tenderness: There is no abdominal tenderness. Musculoskeletal:         General: Normal range of motion. Cervical back: Normal range of motion and neck supple. Lymphadenopathy:      Cervical: No cervical adenopathy. Skin:     General: Skin is warm and dry. Neurological:      General: No focal deficit present. Mental Status: He is alert and oriented to person, place, and time. Psychiatric:         Mood and Affect: Mood normal.         Behavior: Behavior normal.         Thought Content: Thought content normal.         Assessment and Plan:          1. Controlled type 2 diabetes mellitus without complication, with long-term current use of insulin ([facility])  -     blood glucose monitor kit and supplies; Dispense sufficient amount for indicated testing frequency plus additional to accommodate PRN testing needs. Dispense all needed supplies to include: monitor, strips, lancing device, lancets, control solutions, alcohol swabs., Disp-1 kit, R-0, Normal  -     POCT glycosylated hemoglobin (Hb A1C)  -     Insulin Pen Needle (MEIJER PEN NEEDLES) 31G X 6 MM MISC; DAILY Starting Wed 8/18/2021, Disp-100 each, R-3, Normal  -     Ricky Chandler MD, Endocrinology, [facility]  2. Right sciatic nerve pain  -     XR LUMBAR SPINE (2-3 VIEWS); Future  -     cyclobenzaprine (FLEXERIL) 10 MG tablet;  Take 1 tablet by mouth 3 times daily as needed for Muscle spasms, Disp-21 tablet, R-0Normal  - predniSONE (DELTASONE) 20 MG tablet; Take 1 tablet by mouth 2 times daily for 5 days, Disp-10 tablet, R-0Normal     Note off today through Friday return Monday. Patient's A1c significantly elevated today at 9.1. Patient educated to call his endocrinologist and have insulin regimen changed. Patient educated that if he takes prednisone and sugars go above 250 he can stop the prednisone. Patient educated to complete Cologuard at this time. Patient given educational materials - see patient instructions. Discussed use, benefit, and side effects of prescribed medications. All patient questions answered. Pt voiced understanding. Reviewed health maintenance. Instructed to continue current medications, diet and exercise. Patient agreed with treatment plan. Follow up as directed.      Electronically signed by JANIS Julian on 8/18/2021 at 4:48 PM

## 2021-08-18 NOTE — TELEPHONE ENCOUNTER
----- Message from Faraz Gutierrezabbi sent at 8/18/2021  4:58 PM EDT -----  Subject: Message to Provider    QUESTIONS  Information for Provider? Daja Barton with [address] calling to notify   provider that all the other necessary supplies for pt's glucose monitor   have to be ordered separately. ---------------------------------------------------------------------------  --------------  Elena Grass INFO  What is the best way for the office to contact you? OK to leave message on   voicemail  Preferred Call Back Phone Number? [phone]  ---------------------------------------------------------------------------  --------------  SCRIPT ANSWERS  Relationship to Patient? Other  Representative Name? Daja Barton with [facility]  Is the Representative on the appropriate HIPAA document in Epic?  Yes

## 2021-08-19 ENCOUNTER — HOSPITAL ENCOUNTER (OUTPATIENT)
Dept: GENERAL RADIOLOGY | Age: 51
Discharge: HOME | End: 2021-08-21
Payer: COMMERCIAL

## 2021-08-19 ENCOUNTER — HOSPITAL ENCOUNTER (OUTPATIENT)
Age: 51
Discharge: HOME | End: 2021-08-21
Payer: COMMERCIAL

## 2021-08-19 DIAGNOSIS — M54.31 RIGHT SCIATIC NERVE PAIN: ICD-10-CM

## 2021-08-19 PROCEDURE — 72100 X-RAY EXAM L-S SPINE 2/3 VWS: CPT

## 2021-08-20 ENCOUNTER — HOSPITAL ENCOUNTER (OUTPATIENT)
Dept: GENERAL RADIOLOGY | Age: 51
Discharge: HOME | End: 2021-08-22
Payer: COMMERCIAL

## 2021-08-20 ENCOUNTER — HOSPITAL ENCOUNTER (OUTPATIENT)
Age: 51
Discharge: HOME | End: 2021-08-22
Payer: COMMERCIAL

## 2021-08-20 DIAGNOSIS — M79.604 LOW BACK PAIN RADIATING TO RIGHT LOWER EXTREMITY: ICD-10-CM

## 2021-08-20 DIAGNOSIS — M54.50 LOW BACK PAIN RADIATING TO RIGHT LOWER EXTREMITY: ICD-10-CM

## 2021-08-20 PROCEDURE — 72100 X-RAY EXAM L-S SPINE 2/3 VWS: CPT

## 2021-08-23 ENCOUNTER — TELEPHONE (OUTPATIENT)
Dept: PRIMARY CARE CLINIC | Age: 51
End: 2021-08-23

## 2021-08-23 ENCOUNTER — HOSPITAL ENCOUNTER (EMERGENCY)
Age: 51
Discharge: HOME | End: 2021-08-23
Attending: EMERGENCY MEDICINE
Payer: COMMERCIAL

## 2021-08-23 ENCOUNTER — APPOINTMENT (OUTPATIENT)
Dept: CT IMAGING | Age: 51
End: 2021-08-23
Payer: COMMERCIAL

## 2021-08-23 VITALS
HEART RATE: 77 BPM | TEMPERATURE: 98.2 F | RESPIRATION RATE: 18 BRPM | BODY MASS INDEX: 35.7 KG/M2 | DIASTOLIC BLOOD PRESSURE: 72 MMHG | SYSTOLIC BLOOD PRESSURE: 146 MMHG | WEIGHT: 255 LBS | OXYGEN SATURATION: 99 % | HEIGHT: 71 IN

## 2021-08-23 DIAGNOSIS — M54.9 MID BACK PAIN: Primary | ICD-10-CM

## 2021-08-23 DIAGNOSIS — T14.8XXA MUSCLE STRAIN: ICD-10-CM

## 2021-08-23 LAB
-: ABNORMAL
ABSOLUTE EOS #: 0.1 K/UL (ref 0–0.4)
ABSOLUTE IMMATURE GRANULOCYTE: ABNORMAL K/UL (ref 0–0.3)
ABSOLUTE LYMPH #: 1.5 K/UL (ref 1–4.8)
ABSOLUTE MONO #: 0.4 K/UL (ref 0.1–1.3)
ALBUMIN SERPL-MCNC: 4 G/DL (ref 3.5–5.2)
ALBUMIN/GLOBULIN RATIO: ABNORMAL (ref 1–2.5)
ALP BLD-CCNC: 59 U/L (ref 40–129)
ALT SERPL-CCNC: 41 U/L (ref 5–41)
AMORPHOUS: ABNORMAL
ANION GAP SERPL CALCULATED.3IONS-SCNC: 10 MMOL/L (ref 9–17)
AST SERPL-CCNC: 29 U/L
BACTERIA: ABNORMAL
BASOPHILS # BLD: 1 % (ref 0–2)
BASOPHILS ABSOLUTE: 0 K/UL (ref 0–0.2)
BILIRUB SERPL-MCNC: 0.32 MG/DL (ref 0.3–1.2)
BILIRUBIN URINE: ABNORMAL
BNP INTERPRETATION: NORMAL
BUN BLDV-MCNC: 13 MG/DL (ref 6–20)
BUN/CREAT BLD: ABNORMAL (ref 9–20)
C-REACTIVE PROTEIN: <3 MG/L (ref 0–5)
CALCIUM SERPL-MCNC: 8.7 MG/DL (ref 8.6–10.4)
CASTS UA: ABNORMAL /LPF
CHLORIDE BLD-SCNC: 100 MMOL/L (ref 98–107)
CO2: 25 MMOL/L (ref 20–31)
COLOR: ABNORMAL
COMMENT UA: ABNORMAL
CREAT SERPL-MCNC: 0.73 MG/DL (ref 0.7–1.2)
CRYSTALS, UA: ABNORMAL /HPF
DIFFERENTIAL TYPE: ABNORMAL
EOSINOPHILS RELATIVE PERCENT: 4 % (ref 0–4)
EPITHELIAL CELLS UA: ABNORMAL /HPF
GFR AFRICAN AMERICAN: >60 ML/MIN
GFR NON-AFRICAN AMERICAN: >60 ML/MIN
GFR SERPL CREATININE-BSD FRML MDRD: ABNORMAL ML/MIN/{1.73_M2}
GFR SERPL CREATININE-BSD FRML MDRD: ABNORMAL ML/MIN/{1.73_M2}
GLUCOSE BLD-MCNC: 213 MG/DL (ref 70–99)
GLUCOSE URINE: ABNORMAL
HCT VFR BLD CALC: 43.4 % (ref 41–53)
HEMOGLOBIN: 15 G/DL (ref 13.5–17.5)
IMMATURE GRANULOCYTES: ABNORMAL %
INR BLD: 1
KETONES, URINE: NEGATIVE
LACTIC ACID, SEPSIS WHOLE BLOOD: NORMAL MMOL/L (ref 0.5–1.9)
LACTIC ACID, SEPSIS: 1.5 MMOL/L (ref 0.5–1.9)
LEUKOCYTE ESTERASE, URINE: NEGATIVE
LIPASE: 20 U/L (ref 13–60)
LYMPHOCYTES # BLD: 36 % (ref 24–44)
MCH RBC QN AUTO: 30.8 PG (ref 26–34)
MCHC RBC AUTO-ENTMCNC: 34.5 G/DL (ref 31–37)
MCV RBC AUTO: 89.1 FL (ref 80–100)
MONOCYTES # BLD: 9 % (ref 1–7)
MUCUS: ABNORMAL
NITRITE, URINE: NEGATIVE
NRBC AUTOMATED: ABNORMAL PER 100 WBC
OTHER OBSERVATIONS UA: ABNORMAL
PARTIAL THROMBOPLASTIN TIME: 25.7 SEC (ref 24–36)
PDW BLD-RTO: 13.1 % (ref 11.5–14.9)
PH UA: 6 (ref 5–8)
PLATELET # BLD: 125 K/UL (ref 150–450)
PLATELET ESTIMATE: ABNORMAL
PMV BLD AUTO: 8.4 FL (ref 6–12)
POTASSIUM SERPL-SCNC: 4.4 MMOL/L (ref 3.7–5.3)
PRO-BNP: 131 PG/ML
PROTEIN UA: ABNORMAL
PROTHROMBIN TIME: 13 SEC (ref 11.8–14.6)
RBC # BLD: 4.87 M/UL (ref 4.5–5.9)
RBC # BLD: ABNORMAL 10*6/UL
RBC UA: ABNORMAL /HPF
RENAL EPITHELIAL, UA: ABNORMAL /HPF
SEG NEUTROPHILS: 50 % (ref 36–66)
SEGMENTED NEUTROPHILS ABSOLUTE COUNT: 2.1 K/UL (ref 1.3–9.1)
SODIUM BLD-SCNC: 135 MMOL/L (ref 135–144)
SPECIFIC GRAVITY UA: 1.03 (ref 1–1.03)
TOTAL PROTEIN: 6.9 G/DL (ref 6.4–8.3)
TRICHOMONAS: ABNORMAL
TROPONIN INTERP: NORMAL
TROPONIN INTERP: NORMAL
TROPONIN T: NORMAL NG/ML
TROPONIN T: NORMAL NG/ML
TROPONIN, HIGH SENSITIVITY: <6 NG/L (ref 0–22)
TROPONIN, HIGH SENSITIVITY: <6 NG/L (ref 0–22)
TURBIDITY: CLEAR
URINE HGB: NEGATIVE
UROBILINOGEN, URINE: NORMAL
WBC # BLD: 4.1 K/UL (ref 3.5–11)
WBC # BLD: ABNORMAL 10*3/UL
WBC UA: ABNORMAL /HPF
YEAST: ABNORMAL

## 2021-08-23 PROCEDURE — 81001 URINALYSIS AUTO W/SCOPE: CPT

## 2021-08-23 PROCEDURE — 96375 TX/PRO/DX INJ NEW DRUG ADDON: CPT

## 2021-08-23 PROCEDURE — 84484 ASSAY OF TROPONIN QUANT: CPT

## 2021-08-23 PROCEDURE — 6370000000 HC RX 637 (ALT 250 FOR IP): Performed by: EMERGENCY MEDICINE

## 2021-08-23 PROCEDURE — 85025 COMPLETE CBC W/AUTO DIFF WBC: CPT

## 2021-08-23 PROCEDURE — 6360000004 HC RX CONTRAST MEDICATION: Performed by: EMERGENCY MEDICINE

## 2021-08-23 PROCEDURE — 74174 CTA ABD&PLVS W/CONTRAST: CPT

## 2021-08-23 PROCEDURE — 99283 EMERGENCY DEPT VISIT LOW MDM: CPT

## 2021-08-23 PROCEDURE — 2580000003 HC RX 258: Performed by: EMERGENCY MEDICINE

## 2021-08-23 PROCEDURE — 83605 ASSAY OF LACTIC ACID: CPT

## 2021-08-23 PROCEDURE — 6360000002 HC RX W HCPCS: Performed by: EMERGENCY MEDICINE

## 2021-08-23 PROCEDURE — 96374 THER/PROPH/DIAG INJ IV PUSH: CPT

## 2021-08-23 PROCEDURE — 83880 ASSAY OF NATRIURETIC PEPTIDE: CPT

## 2021-08-23 PROCEDURE — 85730 THROMBOPLASTIN TIME PARTIAL: CPT

## 2021-08-23 PROCEDURE — 83690 ASSAY OF LIPASE: CPT

## 2021-08-23 PROCEDURE — 36415 COLL VENOUS BLD VENIPUNCTURE: CPT

## 2021-08-23 PROCEDURE — 86140 C-REACTIVE PROTEIN: CPT

## 2021-08-23 PROCEDURE — 93005 ELECTROCARDIOGRAM TRACING: CPT | Performed by: EMERGENCY MEDICINE

## 2021-08-23 PROCEDURE — 80053 COMPREHEN METABOLIC PANEL: CPT

## 2021-08-23 PROCEDURE — 85610 PROTHROMBIN TIME: CPT

## 2021-08-23 RX ORDER — HYDROCODONE BITARTRATE AND ACETAMINOPHEN 5; 325 MG/1; MG/1
1 TABLET ORAL EVERY 6 HOURS PRN
Qty: 10 TABLET | Refills: 0 | Status: SHIPPED | OUTPATIENT
Start: 2021-08-23 | End: 2021-08-26

## 2021-08-23 RX ORDER — LIDOCAINE 4 G/G
1 PATCH TOPICAL ONCE
Status: DISCONTINUED | OUTPATIENT
Start: 2021-08-23 | End: 2021-08-23 | Stop reason: HOSPADM

## 2021-08-23 RX ORDER — SODIUM CHLORIDE 0.9 % (FLUSH) 0.9 %
10 SYRINGE (ML) INJECTION PRN
Status: DISCONTINUED | OUTPATIENT
Start: 2021-08-23 | End: 2021-08-23 | Stop reason: HOSPADM

## 2021-08-23 RX ORDER — HYDROCODONE BITARTRATE AND ACETAMINOPHEN 5; 325 MG/1; MG/1
2 TABLET ORAL ONCE
Status: COMPLETED | OUTPATIENT
Start: 2021-08-23 | End: 2021-08-23

## 2021-08-23 RX ORDER — 0.9 % SODIUM CHLORIDE 0.9 %
1000 INTRAVENOUS SOLUTION INTRAVENOUS ONCE
Status: COMPLETED | OUTPATIENT
Start: 2021-08-23 | End: 2021-08-23

## 2021-08-23 RX ORDER — 0.9 % SODIUM CHLORIDE 0.9 %
80 INTRAVENOUS SOLUTION INTRAVENOUS ONCE
Status: COMPLETED | OUTPATIENT
Start: 2021-08-23 | End: 2021-08-23

## 2021-08-23 RX ORDER — CYCLOBENZAPRINE HCL 5 MG
5 TABLET ORAL 2 TIMES DAILY PRN
Qty: 10 TABLET | Refills: 0 | Status: SHIPPED | OUTPATIENT
Start: 2021-08-23 | End: 2021-09-01

## 2021-08-23 RX ORDER — FENTANYL CITRATE 50 UG/ML
100 INJECTION, SOLUTION INTRAMUSCULAR; INTRAVENOUS ONCE
Status: COMPLETED | OUTPATIENT
Start: 2021-08-23 | End: 2021-08-23

## 2021-08-23 RX ORDER — ONDANSETRON 2 MG/ML
4 INJECTION INTRAMUSCULAR; INTRAVENOUS ONCE
Status: COMPLETED | OUTPATIENT
Start: 2021-08-23 | End: 2021-08-23

## 2021-08-23 RX ORDER — 0.9 % SODIUM CHLORIDE 0.9 %
1000 INTRAVENOUS SOLUTION INTRAVENOUS ONCE
Status: DISCONTINUED | OUTPATIENT
Start: 2021-08-23 | End: 2021-08-23

## 2021-08-23 RX ORDER — LIDOCAINE 50 MG/G
1 PATCH TOPICAL DAILY
Qty: 30 PATCH | Refills: 0 | Status: SHIPPED | OUTPATIENT
Start: 2021-08-23 | End: 2021-11-19 | Stop reason: ALTCHOICE

## 2021-08-23 RX ADMIN — ONDANSETRON 4 MG: 2 INJECTION INTRAMUSCULAR; INTRAVENOUS at 11:44

## 2021-08-23 RX ADMIN — SODIUM CHLORIDE 1000 ML: 9 INJECTION, SOLUTION INTRAVENOUS at 11:44

## 2021-08-23 RX ADMIN — FENTANYL CITRATE 100 MCG: 0.05 INJECTION, SOLUTION INTRAMUSCULAR; INTRAVENOUS at 11:44

## 2021-08-23 RX ADMIN — SODIUM CHLORIDE 80 ML: 9 INJECTION, SOLUTION INTRAVENOUS at 12:26

## 2021-08-23 RX ADMIN — IOPAMIDOL 100 ML: 755 INJECTION, SOLUTION INTRAVENOUS at 12:26

## 2021-08-23 RX ADMIN — HYDROCODONE BITARTRATE AND ACETAMINOPHEN 2 TABLET: 5; 325 TABLET ORAL at 14:30

## 2021-08-23 RX ADMIN — SODIUM CHLORIDE, PRESERVATIVE FREE 10 ML: 5 INJECTION INTRAVENOUS at 12:26

## 2021-08-23 ASSESSMENT — ENCOUNTER SYMPTOMS
COUGH: 0
CONSTIPATION: 0
DIARRHEA: 0
SHORTNESS OF BREATH: 0
NAUSEA: 0
ABDOMINAL PAIN: 0
COLOR CHANGE: 0
TROUBLE SWALLOWING: 0
VOMITING: 0
BLOOD IN STOOL: 0
BACK PAIN: 1
SORE THROAT: 0

## 2021-08-23 ASSESSMENT — PAIN DESCRIPTION - ONSET: ONSET: GRADUAL

## 2021-08-23 ASSESSMENT — PAIN SCALES - GENERAL
PAINLEVEL_OUTOF10: 9
PAINLEVEL_OUTOF10: 1
PAINLEVEL_OUTOF10: 8
PAINLEVEL_OUTOF10: 4

## 2021-08-23 ASSESSMENT — PAIN DESCRIPTION - PAIN TYPE
TYPE: ACUTE PAIN
TYPE: ACUTE PAIN

## 2021-08-23 ASSESSMENT — PAIN DESCRIPTION - LOCATION
LOCATION: CHEST
LOCATION: BACK

## 2021-08-23 ASSESSMENT — PAIN - FUNCTIONAL ASSESSMENT: PAIN_FUNCTIONAL_ASSESSMENT: PREVENTS OR INTERFERES SOME ACTIVE ACTIVITIES AND ADLS

## 2021-08-23 ASSESSMENT — PAIN DESCRIPTION - DESCRIPTORS: DESCRIPTORS: SHARP;STABBING

## 2021-08-23 ASSESSMENT — PAIN DESCRIPTION - FREQUENCY: FREQUENCY: INTERMITTENT

## 2021-08-23 ASSESSMENT — PAIN DESCRIPTION - ORIENTATION: ORIENTATION: UPPER

## 2021-08-23 ASSESSMENT — PAIN DESCRIPTION - PROGRESSION: CLINICAL_PROGRESSION: GRADUALLY IMPROVING

## 2021-08-23 NOTE — ED TRIAGE NOTES
Mode of arrival (squad #, walk in, police, etc) : Walk in        Chief complaint(s): Back pain, chest pain        Arrival Note (brief scenario, treatment PTA, etc). : Pt arrives to ED c/o back pain that radiates around to his chest. Patient states that the pain has been ongoing for the last couple of days. Patient states that he has been taking muscle relaxers with no relief. Patient denies any cardiac history. EKG completed in triage. C= \"Have you ever felt that you should Cut down on your drinking? \"  No  A= \"Have people Annoyed you by criticizing your drinking? \"  No  G= \"Have you ever felt bad or Guilty about your drinking? \"  No  E= \"Have you ever had a drink as an Eye-opener first thing in the morning to steady your nerves or to help a hangover? \"  No      Deferred []      Reason for deferring: N/A    *If yes to two or more: probable alcohol abuse. *

## 2021-08-23 NOTE — ED PROVIDER NOTES
[address]  EMERGENCY DEPARTMENT ENCOUNTER    Pt Name: Yamileth Machado  MRN: [id number]  YOB: 1970  Date of evaluation:8/23/21  PCP: Cassie [address]       Chief Complaint   Patient presents with    Back Pain    Chest Pain       HISTORY OF PRESENT ILLNESS    Yamielth Machado is a 46 y.o. male who presents with a complaint of back pain. Patient states for the past several days he has had pain in his back that is located between his shoulder blades that wraps around both sides into his chest and also through to his chest.  He does not remember any specific instance where he hurt his back or lifted anything heavy. States he has had issues with lower back pain due to musculoskeletal issues but this pain is different. He states that he is not having any shortness of breath but sometimes the pain gets so severe that it does take his breath away. No nausea, vomiting, diarrhea. No fevers or chills. No cough. Denies any numbness, tingling, weakness in his arms or legs. No headache, dizziness or lightheadedness. He describes the pain as sharp. Rates it as 8 out of 10 in severity right now. Nothing makes his symptoms better or worse other than stated above. He has been taking Motrin and Aleve with minimal relief. Patient has no other complaints at this time. REVIEW OF SYSTEMS       Review of Systems   Constitutional: Negative for chills, fatigue and fever. HENT: Negative for congestion, ear pain, sore throat and trouble swallowing. Eyes: Negative for visual disturbance. Respiratory: Negative for cough and shortness of breath. Cardiovascular: Positive for chest pain. Negative for palpitations and leg swelling. Gastrointestinal: Negative for abdominal pain, blood in stool, constipation, diarrhea, nausea and vomiting. Genitourinary: Negative for dysuria and flank pain. Musculoskeletal: Positive for back pain. Negative for arthralgias, myalgias and neck pain. Skin: Negative for color change, rash and wound. Neurological: Negative for dizziness, weakness, light-headedness, numbness and headaches. Psychiatric/Behavioral: Negative for confusion. All other systems reviewed and are negative. Negative in 10 essential Systems except as mentioned above and in the HPI. PAST MEDICAL HISTORY     Past Medical History:   Diagnosis Date    Anxiety 6/6/2013    Depression 6/6/2013    DM (diabetes mellitus) ([facility] [address] 6/6/2013    ED (erectile dysfunction) 6/6/2013    Elevated ALT measurement 6/6/2013    GERD (gastroesophageal reflux disease) 6/6/2013    HTN (hypertension) 6/6/2013    Hyperlipidemia 6/6/2013    Hypothyroidism 6/6/2013    Obesity 6/6/2013    RAD (reactive airway disease) 6/6/2013    Stage 2 chronic kidney disease 3/26/2021         SURGICAL HISTORY      has a past surgical history that includes Cardiac catheterization and Tonsillectomy. CURRENT MEDICATIONS       Previous Medications    ATENOLOL (TENORMIN) 50 MG TABLET    TAKE ONE TABLET BY MOUTH DAILY    B-D UF III MINI PEN NEEDLES 31G X 5 MM MISC        BLOOD GLUCOSE MONITOR KIT AND SUPPLIES    Dispense sufficient amount for once daily testing frequency plus additional to accommodate PRN testing needs. Dispense all needed supplies to include: monitor, strips, lancing device, lancets, control solutions, alcohol swabs. CYCLOBENZAPRINE (FLEXERIL) 10 MG TABLET    Take 1 tablet by mouth 3 times daily as needed for Muscle spasms    INSULIN GLARGINE (LANTUS SC)    Inject  into the skin. Endo. Dr Siddharth Colin    INSULIN PEN NEEDLE (MEIJER PEN NEEDLES) 31G X 6 MM MISC    1 each by Does not apply route daily    LEVOTHYROXINE (SYNTHROID) 200 MCG TABLET    Take 200 mcg by mouth Daily. METFORMIN (GLUCOPHAGE) 1000 MG TABLET    Take 1,000 mg by mouth 2 times daily (with meals).     PREDNISONE (DELTASONE) 20 MG TABLET    Take 1 tablet by mouth 2 times daily for 5 days       ALLERGIES     is allergic to antihistamines, loratadine-type [antihistamines, loratadine-type]; fenofibrate; and quinapril hcl. FAMILY HISTORY     He indicated that his mother is alive. He indicated that his father is . He indicated that the status of his other is unknown.     family history includes Diabetes in an other family member; Heart Disease in his father, mother, and another family member. SOCIAL HISTORY      reports that he has never smoked. He has never used smokeless tobacco. He reports current alcohol use. He reports previous drug use. PHYSICAL EXAM     INITIAL VITALS:  height is 5' 11\" (1.803 m) and weight is 255 lb (115.7 kg). His oral temperature is 98.2 °F (36.8 °C). His blood pressure is 146/72 (abnormal) and his pulse is 58. His respiration is 15 and oxygen saturation is 99%. Physical Exam  Vitals and nursing note reviewed. Constitutional:       General: He is not in acute distress. HENT:      Head: Normocephalic and atraumatic. Eyes:      Conjunctiva/sclera: Conjunctivae normal.      Pupils: Pupils are equal, round, and reactive to light. Cardiovascular:      Rate and Rhythm: Normal rate and regular rhythm. Heart sounds: Normal heart sounds. No murmur heard. Pulmonary:      Effort: Pulmonary effort is normal. No respiratory distress. Breath sounds: Normal breath sounds. Abdominal:      General: Bowel sounds are normal. There is no distension. Palpations: Abdomen is soft. Tenderness: There is no abdominal tenderness. Musculoskeletal:         General: No tenderness. Cervical back: Neck supple. Lymphadenopathy:      Cervical: No cervical adenopathy. Skin:     General: Skin is warm and dry. Findings: No rash. Neurological:      General: No focal deficit present. Mental Status: He is alert and oriented to person, place, and time.    Psychiatric:         Judgment: Judgment normal.           DIFFERENTIAL DIAGNOSIS/MDM:   70-year-old male presents with several days of atraumatic upper back pain with radiation to his chest.  He is afebrile, nontoxic, normal vital signs other than blood pressure. He is hypertensive at 170s over 100s. He does have a history of hypertension has been taking her medication as prescribed according to him. Differential diagnosis is broad. With his symptoms being exacerbated by upper body movements musculoskeletal pain is a possibility however with his pain being in the upper back I am concerned for aortic dissection, ACS, PE, pneumothorax. We will treat pain. We will get cardiac work-up. Will get CTA of the chest abdomen pelvis. Chart review shows he has had recent work-ups for sciatic type pain however this pain seem to be in his lower back and not consistent with the pain he is complained about now. DIAGNOSTIC RESULTS     EKG: All EKG's are interpreted by the Emergency Department Physician who either signs or Co-signs this chart in the absence of a cardiologist.    EKG Interpretation    Interpreted by me    Rhythm: normal sinus   Rate: normal  Axis: normal  Ectopy: none  Conduction: normal  ST Segments: no acute change  T Waves: no acute change  Q Waves: none    Clinical Impression: Nonspecific EKG    RADIOLOGY:   I directly visualized the following  images and reviewed the radiologist interpretations:  CTA CHEST ABDOMEN PELVIS W CONTRAST   Final Result   No acute thoracic, abdominal, or pelvic abnormality.                  ED BEDSIDE ULTRASOUND:      LABS:  Labs Reviewed   COMPREHENSIVE METABOLIC PANEL W/ REFLEX TO MG FOR LOW K - Abnormal; Notable for the following components:       Result Value    Glucose 213 (*)     All other components within normal limits   CBC WITH AUTO DIFFERENTIAL - Abnormal; Notable for the following components:    Platelets 935 (*)     Monocytes 9 (*)     All other components within normal limits   URINALYSIS - Abnormal; Notable for the following components:    Color, UA DARK YELLOW (*)     Glucose, Ur TRACE (*)     Bilirubin Urine NEGATIVE  Verified by ictotest. (*)     Protein, UA TRACE (*)     All other components within normal limits   MICROSCOPIC URINALYSIS - Abnormal; Notable for the following components:    Bacteria, UA FEW (*)     All other components within normal limits   LIPASE   TROPONIN   BRAIN NATRIURETIC PEPTIDE   APTT   PROTIME-INR   C-REACTIVE PROTEIN   LACTATE, SEPSIS   TROPONIN         EMERGENCY DEPARTMENT COURSE:   Vitals:    Vitals:    08/23/21 1130 08/23/21 1200 08/23/21 1329 08/23/21 1400   BP: (!) 151/97 (!) 156/84 (!) 161/92 (!) 146/72   Pulse: 63 61 62 58   Resp: 21 11 16 15   Temp:       TempSrc:       SpO2: 97% 91% 99% 99%   Weight:       Height:         2:28 PM EDT  Work-up was unremarkable here. CT chest abdomen pelvis is unremarkable. No evidence of PE, dissection, pneumothorax. I doubt patient has ACS. He has 2 - troponins and negative EKG. I think he most likely has musculoskeletal back pain. We will give a short course of pain medication, muscle relaxers and lidocaine patches. I strongly advised him to return if any symptoms worsen. He is agreeable plan will be discharged home today. CRITICALCARE:      CONSULTS:  None      PROCEDURES:      FINAL IMPRESSION      1. Mid back pain    2.  Muscle strain            DISPOSITION/PLAN   DISPOSITION Decision To Discharge 08/23/2021 02:21:40 PM        PATIENT REFERRED TO:  Viky Rosado, [address] [place] [address]  [phone]    Schedule an appointment as soon as possible for a visit       [facility]  [place] [address]  [phone]    As needed, If symptoms worsen      DISCHARGE MEDICATIONS:  New Prescriptions    CYCLOBENZAPRINE (FLEXERIL) 5 MG TABLET    Take 1 tablet by mouth 2 times daily as needed for Muscle spasms    HYDROCODONE-ACETAMINOPHEN (NORCO) 5-325 MG PER TABLET    Take 1 tablet by mouth every 6 hours as needed for Pain for up to 3 days. LIDOCAINE (LIDODERM) 5 %    Place 1 patch onto the skin daily 12 hours on, 12 hours off.        (Please note that portions ofthis note were completed with a voice recognition program.  Efforts were made to edit the dictations but occasionally words are mis-transcribed.)    Adalberto Zamora DO  Attending Emergency Physician          Adalberto Zamora DO  08/23/21 [id number]

## 2021-08-23 NOTE — TELEPHONE ENCOUNTER
Pt is at the er right now, [facility]. Due to his back pain. Will be bringing in his form that needs filled out and today will need to be added as well. I told pt to let us know start date and when, if he knows return date. Stated that, Chandler Wellington and him talked about this last week. FYI.

## 2021-08-24 ENCOUNTER — OFFICE VISIT (OUTPATIENT)
Dept: PRIMARY CARE CLINIC | Age: 51
End: 2021-08-24
Payer: COMMERCIAL

## 2021-08-24 VITALS
HEART RATE: 70 BPM | SYSTOLIC BLOOD PRESSURE: 130 MMHG | DIASTOLIC BLOOD PRESSURE: 92 MMHG | BODY MASS INDEX: 36.93 KG/M2 | WEIGHT: 264.8 LBS | OXYGEN SATURATION: 97 %

## 2021-08-24 DIAGNOSIS — M79.604 LOW BACK PAIN RADIATING TO RIGHT LOWER EXTREMITY: Primary | ICD-10-CM

## 2021-08-24 DIAGNOSIS — M54.50 LOW BACK PAIN RADIATING TO RIGHT LOWER EXTREMITY: Primary | ICD-10-CM

## 2021-08-24 LAB
EKG ATRIAL RATE: 67 BPM
EKG P AXIS: 40 DEGREES
EKG P-R INTERVAL: 170 MS
EKG Q-T INTERVAL: 402 MS
EKG QRS DURATION: 80 MS
EKG QTC CALCULATION (BAZETT): 424 MS
EKG R AXIS: 19 DEGREES
EKG T AXIS: 26 DEGREES
EKG VENTRICULAR RATE: 67 BPM

## 2021-08-24 PROCEDURE — 93010 ELECTROCARDIOGRAM REPORT: CPT | Performed by: INTERNAL MEDICINE

## 2021-08-24 PROCEDURE — 99213 OFFICE O/P EST LOW 20 MIN: CPT | Performed by: PHYSICIAN ASSISTANT

## 2021-08-24 ASSESSMENT — PATIENT HEALTH QUESTIONNAIRE - PHQ9
SUM OF ALL RESPONSES TO PHQ QUESTIONS 1-9: 0
1. LITTLE INTEREST OR PLEASURE IN DOING THINGS: 0
SUM OF ALL RESPONSES TO PHQ QUESTIONS 1-9: 0
SUM OF ALL RESPONSES TO PHQ9 QUESTIONS 1 & 2: 0
2. FEELING DOWN, DEPRESSED OR HOPELESS: 0
SUM OF ALL RESPONSES TO PHQ QUESTIONS 1-9: 0

## 2021-08-24 ASSESSMENT — ENCOUNTER SYMPTOMS
COUGH: 0
ABDOMINAL DISTENTION: 0
CHEST TIGHTNESS: 0
RHINORRHEA: 0
DIARRHEA: 0
EYE DISCHARGE: 0
CONSTIPATION: 0
VOMITING: 0
SINUS PRESSURE: 0
ABDOMINAL PAIN: 0
SHORTNESS OF BREATH: 0
SORE THROAT: 0
PHOTOPHOBIA: 0

## 2021-08-24 NOTE — PROGRESS NOTES
[facility] CARE  [address] [facility]  [address] [address]  Dept: [phone]    Stanley Paul is a 46 y.o. male Established patient, who presents today for his medical conditions/complaints as noted below. Chief Complaint   Patient presents with    Back Pain     Rib pain ED follow up       HPI:     HPI: The patient is having bad rib pain. It is coming and going. He went to ER and was worked up it was very bad. Has not been able to sleep. Hurts to breathe in. He feels like it hurts his back and wraps around left side. . Taking flexeril and will get norco today to take as little as possible.      Reviewed prior notes None  Reviewed previous Labs    LDL Cholesterol (mg/dL)   Date Value   01/28/2021 96     LDL Calculated (mg/dL)   Date Value   01/31/2015 134       (goal LDL is <100)   AST (U/L)   Date Value   08/23/2021 29     ALT (U/L)   Date Value   08/23/2021 41     BUN (mg/dL)   Date Value   08/23/2021 13     Hemoglobin A1C (%)   Date Value   08/18/2021 9.1     TSH (mIU/L)   Date Value   01/28/2021 0.83     BP Readings from Last 3 Encounters:   08/24/21 (!) 130/92   08/23/21 (!) 146/72   08/18/21 132/82          (goal 120/80)    Past Medical History:   Diagnosis Date    Anxiety 6/6/2013    Depression 6/6/2013    DM (diabetes mellitus) ([facility] [address] 6/6/2013    ED (erectile dysfunction) 6/6/2013    Elevated ALT measurement 6/6/2013    GERD (gastroesophageal reflux disease) 6/6/2013    HTN (hypertension) 6/6/2013    Hyperlipidemia 6/6/2013    Hypothyroidism 6/6/2013    Obesity 6/6/2013    RAD (reactive airway disease) 6/6/2013    Stage 2 chronic kidney disease 3/26/2021      Past Surgical History:   Procedure Laterality Date    CARDIAC CATHETERIZATION      TONSILLECTOMY         Family History   Problem Relation Age of Onset    Diabetes Other     Heart Disease Other     Heart Disease Mother     Heart Disease Father        Social History     Tobacco Use    External ear normal.      Left Ear: External ear normal.      Nose: Nose normal.      Mouth/Throat:      Mouth: Mucous membranes are moist.   Eyes:      Extraocular Movements: Extraocular movements intact. Conjunctiva/sclera: Conjunctivae normal.      Pupils: Pupils are equal, round, and reactive to light. Neck:      Vascular: No carotid bruit. Cardiovascular:      Rate and Rhythm: Normal rate and regular rhythm. Pulses: Normal pulses. Heart sounds: Normal heart sounds. Pulmonary:      Effort: Pulmonary effort is normal. No respiratory distress. Breath sounds: Normal breath sounds. Abdominal:      General: Bowel sounds are normal. There is no distension. Tenderness: There is no abdominal tenderness. Musculoskeletal:         General: Normal range of motion. Cervical back: Normal range of motion and neck supple. Lymphadenopathy:      Cervical: No cervical adenopathy. Skin:     General: Skin is warm and dry. Neurological:      General: No focal deficit present. Mental Status: He is alert and oriented to person, place, and time. Psychiatric:         Mood and Affect: Mood normal.         Behavior: Behavior normal.         Thought Content: Thought content normal.         Assessment and Plan:       The patient will send disability for the next week off work. 1. Low back pain radiating to right lower extremity  Continue medication as needed will need physical therapy referral if pain continues over a week. May need pain management referral as well. Patient given educational materials - see patient instructions. Discussed use, benefit, and side effects of prescribed medications. All patient questions answered. Pt voiced understanding. Reviewed health maintenance. Instructed to continue current medications, diet and exercise. Patient agreed with treatment plan. Follow up as directed.      Electronically signed by JANIS Tatum on 8/24/2021 at 9:28 AM

## 2021-08-31 ENCOUNTER — TELEPHONE (OUTPATIENT)
Dept: PRIMARY CARE CLINIC | Age: 51
End: 2021-08-31

## 2021-08-31 NOTE — TELEPHONE ENCOUNTER
See message below. Isabella Bland from [employer]. Asking if we can fax a return to work note for pt?  Ph: [phone]  Fax # [phone]

## 2021-09-01 ENCOUNTER — OFFICE VISIT (OUTPATIENT)
Dept: PRIMARY CARE CLINIC | Age: 51
End: 2021-09-01
Payer: COMMERCIAL

## 2021-09-01 VITALS
SYSTOLIC BLOOD PRESSURE: 130 MMHG | DIASTOLIC BLOOD PRESSURE: 84 MMHG | HEIGHT: 71 IN | HEART RATE: 69 BPM | WEIGHT: 257.4 LBS | OXYGEN SATURATION: 97 % | BODY MASS INDEX: 36.03 KG/M2

## 2021-09-01 DIAGNOSIS — M43.06 SPONDYLOLYSIS OF LUMBAR REGION: ICD-10-CM

## 2021-09-01 DIAGNOSIS — M54.41 ACUTE BILATERAL LOW BACK PAIN WITH RIGHT-SIDED SCIATICA: Primary | ICD-10-CM

## 2021-09-01 PROCEDURE — 99213 OFFICE O/P EST LOW 20 MIN: CPT | Performed by: PHYSICIAN ASSISTANT

## 2021-09-01 RX ORDER — BLOOD SUGAR DIAGNOSTIC
STRIP MISCELLANEOUS
COMMUNITY
Start: 2021-08-19 | End: 2021-11-15

## 2021-09-01 RX ORDER — BLOOD-GLUCOSE METER
EACH MISCELLANEOUS
COMMUNITY
Start: 2021-08-19

## 2021-09-01 RX ORDER — LANCETS
EACH MISCELLANEOUS
COMMUNITY
Start: 2021-08-19

## 2021-09-01 RX ORDER — CYCLOBENZAPRINE HCL 10 MG
10 TABLET ORAL 2 TIMES DAILY PRN
Qty: 30 TABLET | Refills: 0 | Status: SHIPPED | OUTPATIENT
Start: 2021-09-01 | End: 2021-09-11

## 2021-09-01 RX ORDER — NAPROXEN 500 MG/1
500 TABLET ORAL 2 TIMES DAILY WITH MEALS
Qty: 60 TABLET | Refills: 1 | Status: SHIPPED | OUTPATIENT
Start: 2021-09-01 | End: 2021-11-01

## 2021-09-01 NOTE — PROGRESS NOTES
[facility] PRIMARY CARE  [address] [address]  Dept: [phone]    Nhung Cid is a 46 y.o. male Established patient, who presents today for his medical conditions/complaints as noted below. Chief Complaint   Patient presents with    Back Pain     x 2 weeks       HPI:     HPI   Pt says the low back pain started when he twisted on a ladder while doing halie for his son-in-law. Did not actually fall off the ladder to the ground. Saw JANIS Arvizu for this on 8/18. His lumbar xrays showed bilat spondylolysis at L5. Says his pain is across the entire lower back and he has slight Rt sided sciatic pain, but the sciatic pain does not bother him much. No n/t in his legs. He also went to the ED on 8/23 for thoracic back pain that radiated to his ribs, but the thoracic back pain has improved. He only took 1 Norco and has not continued that. He is taking flexeril bid and Aleve 2 tabs bid, which helps some. He is not using the lidocaine patches anymore because he does not think they are very effective. Has been to the chiropractor once since this pain started. Pt has been off work for a couple weeks. Pt tried to go back to work yesterday and says his pain was not bad a work, but pain was severe again this morning with stiffness. Couldn't even get dressed without help. 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 Not much lifting. Has not done his Cologuard yet because he is afraid to get bad results.        Reviewed prior notes None other than Emi Montes notes regarding his back pain  Reviewed previous Labs and Imaging Lumbar xrays from 8/2021 and CMP from 8/23/21    LDL Cholesterol (mg/dL)   Date Value   01/28/2021 96     LDL Calculated (mg/dL)   Date Value   01/31/2015 134       (goal LDL is <100)   AST (U/L)   Date Value   08/23/2021 29 ALT (U/L)   Date Value   08/23/2021 41     BUN (mg/dL)   Date Value   08/23/2021 13     Hemoglobin A1C (%)   Date Value   08/18/2021 9.1     TSH (mIU/L)   Date Value   01/28/2021 0.83     BP Readings from Last 3 Encounters:   09/01/21 130/84   08/24/21 (!) 130/92   08/23/21 (!) 146/72          (goal 120/80)    Past Medical History:   Diagnosis Date    Anxiety 6/6/2013    Depression 6/6/2013    DM (diabetes mellitus) ([facility] [address] 6/6/2013    ED (erectile dysfunction) 6/6/2013    Elevated ALT measurement 6/6/2013    GERD (gastroesophageal reflux disease) 6/6/2013    HTN (hypertension) 6/6/2013    Hyperlipidemia 6/6/2013    Hypothyroidism 6/6/2013    Obesity 6/6/2013    RAD (reactive airway disease) 6/6/2013    Stage 2 chronic kidney disease 3/26/2021      Past Surgical History:   Procedure Laterality Date    CARDIAC CATHETERIZATION      TONSILLECTOMY         Family History   Problem Relation Age of Onset    Diabetes Other     Heart Disease Other     Heart Disease Mother     Heart Disease Father        Social History     Tobacco Use    Smoking status: Never Smoker    Smokeless tobacco: Never Used   Substance Use Topics    Alcohol use: Yes     Comment: rarely      Current Outpatient Medications   Medication Sig Dispense Refill    metFORMIN (GLUCOPHAGE) 1000 MG tablet TAKE 1 TABLET BY MOUTH EVERY DAY WITH A MEAL      Blood Glucose Monitoring Suppl (ACCU-CHEK GUIDE) w/Device KIT USE TO TEST 2 TIMES DAILY      Accu-Chek FastClix Lancets MISC USE TO TEST 2 TIMES DAILY      ACCU-CHEK GUIDE strip USE TO TEST 2 TIMES DAILY      cyclobenzaprine (FLEXERIL) 10 MG tablet Take 1 tablet by mouth 2 times daily as needed for Muscle spasms 30 tablet 0    naproxen (NAPROSYN) 500 MG tablet Take 1 tablet by mouth 2 times daily (with meals) 60 tablet 1    lidocaine (LIDODERM) 5 % Place 1 patch onto the skin daily 12 hours on, 12 hours off.  30 patch 0    Insulin Pen Needle (MEIJER PEN NEEDLES) 31G X 6 MM MISC 1 each by Does not apply route daily 100 each 3    blood glucose monitor kit and supplies Dispense sufficient amount for once daily testing frequency plus additional to accommodate PRN testing needs. Dispense all needed supplies to include: monitor, strips, lancing device, lancets, control solutions, alcohol swabs. 1 kit 0    atenolol (TENORMIN) 50 MG tablet TAKE ONE TABLET BY MOUTH DAILY 14 tablet 0    B-D UF III MINI PEN NEEDLES 31G X 5 MM MISC       Insulin Glargine (LANTUS SC) Inject  into the skin. Endo. Dr Mervat Loco      levothyroxine (SYNTHROID) 200 MCG tablet Take 200 mcg by mouth Daily. No current facility-administered medications for this visit. Allergies   Allergen Reactions    Antihistamines, Loratadine-Type [Antihistamines, Loratadine-Type]     Fenofibrate     Quinapril Hcl        Health Maintenance   Topic Date Due    COVID-19 Vaccine (1) Never done    Hepatitis B vaccine (1 of 3 - Risk 3-dose series) Never done    Colon cancer screen colonoscopy  Never done    Shingles Vaccine (1 of 2) Never done    Diabetic retinal exam  03/27/2020    Flu vaccine (1) 09/02/2022 (Originally 9/1/2021)    Pneumococcal 0-64 years Vaccine (1 of 2 - PPSV23) 09/02/2022 (Originally 3/26/1976)    A1C test (Diabetic or Prediabetic)  11/18/2021    Diabetic foot exam  01/25/2022    Diabetic microalbuminuria test  01/25/2022    Lipid screen  01/28/2022    TSH testing  01/28/2022    Potassium monitoring  08/23/2022    Creatinine monitoring  08/23/2022    DTaP/Tdap/Td vaccine (2 - Td or Tdap) 07/28/2024    Hepatitis C screen  Completed    HIV screen  Completed    Hepatitis A vaccine  Aged Out    Hib vaccine  Aged Out    Meningococcal (ACWY) vaccine  Aged Out       Subjective:      Review of Systems   Musculoskeletal: Positive for back pain. Neurological: Negative for numbness.        Objective:     /84   Pulse 69   Ht 5' 11\" (1.803 m)   Wt 257 lb 6.4 oz (116.8 kg)   SpO2 97%   BMI 35.90 kg/m²   Physical Exam  Vitals and nursing note reviewed. Constitutional:       Appearance: Normal appearance. HENT:      Head: Normocephalic and atraumatic. Cardiovascular:      Rate and Rhythm: Normal rate and regular rhythm. Pulmonary:      Effort: Pulmonary effort is normal.      Breath sounds: Normal breath sounds. Musculoskeletal:      Lumbar back: No tenderness or bony tenderness. Comments: Said SLR caused right SI pain, but no sciatic pain  Pain with back extension, bilat rotation and lateral flexion. Neurological:      Mental Status: He is alert. Deep Tendon Reflexes:      Reflex Scores:       Patellar reflexes are 1+ on the right side and 2+ on the left side. Achilles reflexes are 2+ on the right side and 2+ on the left side. Assessment/Plan:   1. Acute bilateral low back pain with right-sided sciatica  -     External Referral To Physical Therapy  -     cyclobenzaprine (FLEXERIL) 10 MG tablet; Take 1 tablet by mouth 2 times daily as needed for Muscle spasms, Disp-30 tablet, R-0Normal  -     naproxen (NAPROSYN) 500 MG tablet; Take 1 tablet by mouth 2 times daily (with meals), Disp-60 tablet, R-1Normal  2. Spondylolysis of lumbar region  -     External Referral To Physical Therapy  -     cyclobenzaprine (FLEXERIL) 10 MG tablet; Take 1 tablet by mouth 2 times daily as needed for Muscle spasms, Disp-30 tablet, R-0Normal  -     naproxen (NAPROSYN) 500 MG tablet; Take 1 tablet by mouth 2 times daily (with meals), Disp-60 tablet, R-1Normal     Pt said  does not allow employees to work light duty. Will change FMLA to continue continous leave with new estimated return date of 9/20/21 while pt starts physical therapy. Encouraged pt to do his Cologuard. Return in about 16 days (around 9/17/2021) for with Corey Reed to recheck back, possible work release.     Orders Placed This Encounter   Procedures    External Referral To Physical Therapy     Referral Priority: Routine     Referral Type:   Eval and Treat     Referral Reason:   Specialty Services Required     Requested Specialty:   Physical Therapy     Number of Visits Requested:   1     Orders Placed This Encounter   Medications    cyclobenzaprine (FLEXERIL) 10 MG tablet     Sig: Take 1 tablet by mouth 2 times daily as needed for Muscle spasms     Dispense:  30 tablet     Refill:  0    naproxen (NAPROSYN) 500 MG tablet     Sig: Take 1 tablet by mouth 2 times daily (with meals)     Dispense:  60 tablet     Refill:  1       Patient given educational materials - see patient instructions. Discussed use, benefit, and side effects of prescribed medications. All patient questions answered. Pt voiced understanding. Reviewed health maintenance. Instructed to continue current medications, diet and exercise. Patient agreed with treatment plan. Follow up as directed.      Electronically signed by Rhett Díaz PA-C on 9/2/2021 at 9:56 AM

## 2021-09-02 PROBLEM — M43.06 SPONDYLOLYSIS OF LUMBAR REGION: Status: ACTIVE | Noted: 2021-09-02

## 2021-09-02 ASSESSMENT — ENCOUNTER SYMPTOMS: BACK PAIN: 1

## 2021-09-17 ENCOUNTER — OFFICE VISIT (OUTPATIENT)
Dept: PRIMARY CARE CLINIC | Age: 51
End: 2021-09-17
Payer: COMMERCIAL

## 2021-09-17 VITALS
HEART RATE: 81 BPM | SYSTOLIC BLOOD PRESSURE: 130 MMHG | BODY MASS INDEX: 35.48 KG/M2 | OXYGEN SATURATION: 96 % | HEIGHT: 71 IN | WEIGHT: 253.4 LBS | DIASTOLIC BLOOD PRESSURE: 86 MMHG

## 2021-09-17 DIAGNOSIS — E11.9 TYPE 2 DIABETES MELLITUS WITHOUT COMPLICATION, WITH LONG-TERM CURRENT USE OF INSULIN (HCC): Primary | ICD-10-CM

## 2021-09-17 DIAGNOSIS — Z79.4 TYPE 2 DIABETES MELLITUS WITHOUT COMPLICATION, WITH LONG-TERM CURRENT USE OF INSULIN (HCC): Primary | ICD-10-CM

## 2021-09-17 DIAGNOSIS — E11.65 TYPE 2 DIABETES MELLITUS WITH HYPERGLYCEMIA, UNSPECIFIED WHETHER LONG TERM INSULIN USE (HCC): ICD-10-CM

## 2021-09-17 PROCEDURE — 99213 OFFICE O/P EST LOW 20 MIN: CPT | Performed by: PHYSICIAN ASSISTANT

## 2021-09-17 ASSESSMENT — ENCOUNTER SYMPTOMS
EYE DISCHARGE: 0
RHINORRHEA: 0
ABDOMINAL DISTENTION: 0
SHORTNESS OF BREATH: 0
ABDOMINAL PAIN: 0
CONSTIPATION: 0
CHEST TIGHTNESS: 0
PHOTOPHOBIA: 0
SINUS PRESSURE: 0
SORE THROAT: 0
DIARRHEA: 0
VOMITING: 0
COUGH: 0

## 2021-09-17 NOTE — PROGRESS NOTES
Heart Disease Father        Social History     Tobacco Use    Smoking status: Never Smoker    Smokeless tobacco: Never Used   Substance Use Topics    Alcohol use: Yes     Comment: rarely      Current Outpatient Medications   Medication Sig Dispense Refill    metFORMIN (GLUCOPHAGE) 1000 MG tablet TAKE 1 TABLET BY MOUTH twice daily 60 tablet 0    Blood Glucose Monitoring Suppl (ACCU-CHEK GUIDE) w/Device KIT USE TO TEST 2 TIMES DAILY      Accu-Chek FastClix Lancets MISC USE TO TEST 2 TIMES DAILY      ACCU-CHEK GUIDE strip USE TO TEST 2 TIMES DAILY      naproxen (NAPROSYN) 500 MG tablet Take 1 tablet by mouth 2 times daily (with meals) 60 tablet 1    lidocaine (LIDODERM) 5 % Place 1 patch onto the skin daily 12 hours on, 12 hours off. 30 patch 0    Insulin Pen Needle (MEIJER PEN NEEDLES) 31G X 6 MM MISC 1 each by Does not apply route daily 100 each 3    blood glucose monitor kit and supplies Dispense sufficient amount for once daily testing frequency plus additional to accommodate PRN testing needs. Dispense all needed supplies to include: monitor, strips, lancing device, lancets, control solutions, alcohol swabs. 1 kit 0    atenolol (TENORMIN) 50 MG tablet TAKE ONE TABLET BY MOUTH DAILY 14 tablet 0    B-D UF III MINI PEN NEEDLES 31G X 5 MM MISC       Insulin Glargine (LANTUS SC) Inject 70 Units into the skin every morning Endo. Dr Scar Conway      levothyroxine (SYNTHROID) 200 MCG tablet Take 200 mcg by mouth Daily. No current facility-administered medications for this visit.      Allergies   Allergen Reactions    Antihistamines, Loratadine-Type [Antihistamines, Loratadine-Type]     Fenofibrate     Quinapril Hcl        Health Maintenance   Topic Date Due    COVID-19 Vaccine (1) Never done    Hepatitis B vaccine (1 of 3 - Risk 3-dose series) Never done    Colon cancer screen colonoscopy  Never done    Shingles Vaccine (1 of 2) Never done    Diabetic retinal exam  03/27/2020    Flu vaccine (1) 09/02/2022 (Originally 9/1/2021)    Pneumococcal 0-64 years Vaccine (1 of 2 - PPSV23) 09/02/2022 (Originally 3/26/1976)    A1C test (Diabetic or Prediabetic)  11/18/2021    Diabetic foot exam  01/25/2022    Diabetic microalbuminuria test  01/25/2022    Lipid screen  01/28/2022    TSH testing  01/28/2022    Potassium monitoring  08/23/2022    Creatinine monitoring  08/23/2022    DTaP/Tdap/Td vaccine (2 - Td or Tdap) 07/28/2024    Hepatitis C screen  Completed    HIV screen  Completed    Hepatitis A vaccine  Aged Out    Hib vaccine  Aged Out    Meningococcal (ACWY) vaccine  Aged Out       Subjective:      Review of Systems   Constitutional: Negative for chills, fever and unexpected weight change. HENT: Negative for congestion, hearing loss, rhinorrhea, sinus pressure and sore throat. Eyes: Negative for photophobia, discharge and visual disturbance. Respiratory: Negative for cough, chest tightness and shortness of breath. Cardiovascular: Negative for chest pain, palpitations and leg swelling. Gastrointestinal: Negative for abdominal distention, abdominal pain, constipation, diarrhea and vomiting. Endocrine: Negative for polydipsia and polyuria. Genitourinary: Negative for decreased urine volume, difficulty urinating, frequency and urgency. Musculoskeletal: Negative for arthralgias, gait problem and myalgias. Skin: Negative for rash. Allergic/Immunologic: Negative for food allergies. Neurological: Negative for dizziness, weakness, numbness and headaches. Hematological: Negative for adenopathy. Psychiatric/Behavioral: Negative for dysphoric mood and sleep disturbance. The patient is not nervous/anxious. Objective:     /86   Pulse 81   Ht 5' 11\" (1.803 m)   Wt 253 lb 6.4 oz (114.9 kg)   SpO2 96%   BMI 35.34 kg/m²   Physical Exam  Constitutional:       General: He is not in acute distress. Appearance: Normal appearance. He is not ill-appearing. prescribed medications. All patient questions answered. Pt voiced understanding. Reviewed health maintenance. Instructed to continue current medications, diet and exercise. Patient agreed with treatment plan. Follow up as directed.      Electronically signed by Marjie Holter, PA on 9/17/2021 at 11:14 AM

## 2021-09-17 NOTE — LETTER
[facility]  [address] [place] [address]  Phone: [phone]  Fax: [address] [place], [place]        September 17, 2021     Patient: Collette Apple   YOB: 1970   Date of Visit: 9/17/2021       To Whom It May Concern:     Collette Apple may return to work on 9-. If you have any questions or concerns, please don't hesitate to call.     Sincerely,        JANIS Joaquin

## 2021-10-31 DIAGNOSIS — M54.41 ACUTE BILATERAL LOW BACK PAIN WITH RIGHT-SIDED SCIATICA: ICD-10-CM

## 2021-10-31 DIAGNOSIS — M43.06 SPONDYLOLYSIS OF LUMBAR REGION: ICD-10-CM

## 2021-11-01 RX ORDER — NAPROXEN 500 MG/1
TABLET ORAL
Qty: 60 TABLET | Refills: 1 | Status: SHIPPED | OUTPATIENT
Start: 2021-11-01

## 2021-11-15 RX ORDER — BLOOD SUGAR DIAGNOSTIC
STRIP MISCELLANEOUS
Qty: 200 STRIP | Refills: 3 | Status: SHIPPED | OUTPATIENT
Start: 2021-11-15

## 2021-11-16 DIAGNOSIS — Z79.4 TYPE 2 DIABETES MELLITUS WITHOUT COMPLICATION, WITH LONG-TERM CURRENT USE OF INSULIN (HCC): ICD-10-CM

## 2021-11-16 DIAGNOSIS — E11.9 TYPE 2 DIABETES MELLITUS WITHOUT COMPLICATION, WITH LONG-TERM CURRENT USE OF INSULIN (HCC): ICD-10-CM

## 2021-11-19 ENCOUNTER — OFFICE VISIT (OUTPATIENT)
Dept: PRIMARY CARE CLINIC | Age: 51
End: 2021-11-19
Payer: COMMERCIAL

## 2021-11-19 VITALS
SYSTOLIC BLOOD PRESSURE: 136 MMHG | DIASTOLIC BLOOD PRESSURE: 82 MMHG | HEART RATE: 68 BPM | BODY MASS INDEX: 36.29 KG/M2 | WEIGHT: 260.2 LBS | OXYGEN SATURATION: 97 %

## 2021-11-19 DIAGNOSIS — Z79.4 TYPE 2 DIABETES MELLITUS WITHOUT COMPLICATION, WITH LONG-TERM CURRENT USE OF INSULIN (HCC): Primary | ICD-10-CM

## 2021-11-19 DIAGNOSIS — E11.9 TYPE 2 DIABETES MELLITUS WITHOUT COMPLICATION, WITH LONG-TERM CURRENT USE OF INSULIN (HCC): Primary | ICD-10-CM

## 2021-11-19 LAB — HBA1C MFR BLD: 8.6 %

## 2021-11-19 PROCEDURE — 83036 HEMOGLOBIN GLYCOSYLATED A1C: CPT | Performed by: PHYSICIAN ASSISTANT

## 2021-11-19 PROCEDURE — 99214 OFFICE O/P EST MOD 30 MIN: CPT | Performed by: PHYSICIAN ASSISTANT

## 2021-11-19 RX ORDER — FLASH GLUCOSE SENSOR
1 KIT MISCELLANEOUS
Qty: 2 EACH | Refills: 0 | Status: SHIPPED | OUTPATIENT
Start: 2021-11-19

## 2021-11-19 RX ORDER — CANAGLIFLOZIN 100 MG/1
100 TABLET, FILM COATED ORAL
Qty: 30 TABLET | Refills: 0 | Status: SHIPPED | OUTPATIENT
Start: 2021-11-19 | End: 2021-12-14

## 2021-11-19 ASSESSMENT — ENCOUNTER SYMPTOMS
VOMITING: 0
PHOTOPHOBIA: 0
ABDOMINAL DISTENTION: 0
COUGH: 0
SHORTNESS OF BREATH: 0
DIARRHEA: 0
RHINORRHEA: 0
CHEST TIGHTNESS: 0
SINUS PRESSURE: 0
ABDOMINAL PAIN: 0
CONSTIPATION: 0
EYE DISCHARGE: 0
SORE THROAT: 0

## 2021-11-19 NOTE — PROGRESS NOTES
[facility]  [address]  [address] [address]  Dept: [phone]    Adeola Carrero is a 46 y.o. male Established patient, who presents today for his medical conditions/complaints as noted below. Chief Complaint   Patient presents with    Diabetes     Patient checks blood sugar at home    Hypertension     Patient doens't check B/P at home    Medication Check       HPI:     HPI: The patient is taking lantus and two medication. He is seeing better numbers 140-150. Some low sugar readings. 8.6 is A1c now.      Reviewed prior notes None  Reviewed previous Labs    LDL Cholesterol (mg/dL)   Date Value   01/28/2021 96     LDL Calculated (mg/dL)   Date Value   01/31/2015 134       (goal LDL is <100)   AST (U/L)   Date Value   08/23/2021 29     ALT (U/L)   Date Value   08/23/2021 41     BUN (mg/dL)   Date Value   08/23/2021 13     Hemoglobin A1C (%)   Date Value   08/18/2021 9.1     TSH (mIU/L)   Date Value   01/28/2021 0.83     BP Readings from Last 3 Encounters:   11/19/21 136/82   09/17/21 130/86   09/01/21 130/84          (goal 120/80)    Past Medical History:   Diagnosis Date    Anxiety 6/6/2013    Depression 6/6/2013    DM (diabetes mellitus) ([facility] [address] 6/6/2013    ED (erectile dysfunction) 6/6/2013    Elevated ALT measurement 6/6/2013    GERD (gastroesophageal reflux disease) 6/6/2013    HTN (hypertension) 6/6/2013    Hyperlipidemia 6/6/2013    Hypothyroidism 6/6/2013    Obesity 6/6/2013    RAD (reactive airway disease) 6/6/2013    Stage 2 chronic kidney disease 3/26/2021      Past Surgical History:   Procedure Laterality Date    CARDIAC CATHETERIZATION      TONSILLECTOMY         Family History   Problem Relation Age of Onset    Diabetes Other     Heart Disease Other     Heart Disease Mother     Heart Disease Father        Social History     Tobacco Use    Smoking status: Never Smoker    Smokeless tobacco: Never Used   Substance Use Topics  Alcohol use: Yes     Comment: rarely      Current Outpatient Medications   Medication Sig Dispense Refill    Continuous Blood Gluc Sensor (FREESTYLE MAXINE 14 DAY SENSOR) MISC 1 each by Does not apply route every 14 days 2 each 0    canagliflozin (INVOKANA) 100 MG TABS tablet Take 1 tablet by mouth every morning (before breakfast) 30 tablet 0    metFORMIN (GLUCOPHAGE) 1000 MG tablet TAKE 1 TABLET BY MOUTH TWICE A DAY 60 tablet 0    ACCU-CHEK GUIDE strip USE TO TEST 2 TIMES DAILY 200 strip 3    Blood Glucose Monitoring Suppl (ACCU-CHEK GUIDE) w/Device KIT USE TO TEST 2 TIMES DAILY      Accu-Chek FastClix Lancets MISC USE TO TEST 2 TIMES DAILY      Insulin Pen Needle (MEIJER PEN NEEDLES) 31G X 6 MM MISC 1 each by Does not apply route daily 100 each 3    blood glucose monitor kit and supplies Dispense sufficient amount for once daily testing frequency plus additional to accommodate PRN testing needs. Dispense all needed supplies to include: monitor, strips, lancing device, lancets, control solutions, alcohol swabs. 1 kit 0    atenolol (TENORMIN) 50 MG tablet TAKE ONE TABLET BY MOUTH DAILY 14 tablet 0    B-D UF III MINI PEN NEEDLES 31G X 5 MM MISC       Insulin Glargine (LANTUS SC) Inject 70 Units into the skin every morning Endo. Dr Kathie Costa      levothyroxine (SYNTHROID) 200 MCG tablet Take 200 mcg by mouth Daily.  naproxen (NAPROSYN) 500 MG tablet TAKE 1 TABLET BY MOUTH TWICE A DAY WITH MEALS (Patient not taking: Reported on 11/19/2021) 60 tablet 1     No current facility-administered medications for this visit.      Allergies   Allergen Reactions    Antihistamines, Loratadine-Type [Antihistamines, Loratadine-Type]     Fenofibrate     Quinapril Hcl        Health Maintenance   Topic Date Due    COVID-19 Vaccine (1) Never done    Hepatitis B vaccine (1 of 3 - Risk 3-dose series) Never done    Colon cancer screen colonoscopy  Never done    Shingles Vaccine (1 of 2) Never done    Diabetic retinal exam  03/27/2020    A1C test (Diabetic or Prediabetic)  11/18/2021    Flu vaccine (1) 09/02/2022 (Originally 9/1/2021)    Pneumococcal 0-64 years Vaccine (1 of 2 - PPSV23) 09/02/2022 (Originally [birth year]/26/1976)    Diabetic foot exam  01/25/2022    Diabetic microalbuminuria test  01/25/2022    Lipid screen  01/28/2022    TSH testing  01/28/2022    Potassium monitoring  08/23/2022    Creatinine monitoring  08/23/2022    DTaP/Tdap/Td vaccine (2 - Td or Tdap) 07/28/2024    Hepatitis C screen  Completed    HIV screen  Completed    Hepatitis A vaccine  Aged Out    Hib vaccine  Aged Out    Meningococcal (ACWY) vaccine  Aged Out       Subjective:      Review of Systems   Constitutional: Negative for chills, fever and unexpected weight change. HENT: Negative for congestion, hearing loss, rhinorrhea, sinus pressure and sore throat. Eyes: Negative for photophobia, discharge and visual disturbance. Respiratory: Negative for cough, chest tightness and shortness of breath. Cardiovascular: Negative for chest pain, palpitations and leg swelling. Gastrointestinal: Negative for abdominal distention, abdominal pain, constipation, diarrhea and vomiting. Endocrine: Negative for polydipsia and polyuria. Genitourinary: Negative for decreased urine volume, difficulty urinating, frequency and urgency. Musculoskeletal: Negative for arthralgias, gait problem and myalgias. Skin: Negative for rash. Allergic/Immunologic: Negative for food allergies. Neurological: Negative for dizziness, weakness, numbness and headaches. Hematological: Negative for adenopathy. Psychiatric/Behavioral: Negative for dysphoric mood and sleep disturbance. The patient is not nervous/anxious. Objective:     /82   Pulse 68   Wt 260 lb 3.2 oz (118 kg)   SpO2 97%   BMI 36.29 kg/m²   Physical Exam  Constitutional:       General: He is not in acute distress. Appearance: Normal appearance.  He is not ill-appearing. HENT:      Head: Normocephalic and atraumatic. Right Ear: External ear normal.      Left Ear: External ear normal.      Nose: Nose normal.      Mouth/Throat:      Mouth: Mucous membranes are moist.   Eyes:      Extraocular Movements: Extraocular movements intact. Conjunctiva/sclera: Conjunctivae normal.      Pupils: Pupils are equal, round, and reactive to light. Neck:      Vascular: No carotid bruit. Cardiovascular:      Rate and Rhythm: Normal rate and regular rhythm. Pulses: Normal pulses. Heart sounds: Normal heart sounds. Pulmonary:      Effort: Pulmonary effort is normal. No respiratory distress. Breath sounds: Normal breath sounds. Abdominal:      General: Bowel sounds are normal. There is no distension. Tenderness: There is no abdominal tenderness. Musculoskeletal:         General: Normal range of motion. Cervical back: Normal range of motion and neck supple. Lymphadenopathy:      Cervical: No cervical adenopathy. Skin:     General: Skin is warm and dry. Neurological:      General: No focal deficit present. Mental Status: He is alert and oriented to person, place, and time. Psychiatric:         Mood and Affect: Mood normal.         Behavior: Behavior normal.         Thought Content: Thought content normal.         Assessment and Plan:          1. Type 2 diabetes mellitus without complication, with long-term current use of insulin ([facility])  -     POCT glycosylated hemoglobin (Hb A1C)  -     Continuous Blood Gluc Sensor (FREESTYLE MAXINE 14 DAY SENSOR) MISC; 1 each by Does not apply route every 14 days, Disp-2 each, R-0Normal  -     canagliflozin (INVOKANA) 100 MG TABS tablet; Take 1 tablet by mouth every morning (before breakfast), Disp-30 tablet, R-0Normal           Patient given educational materials - see patient instructions. Discussed use, benefit, and side effects of prescribed medications. All patient questions answered.  Pt voiced understanding. Reviewed health maintenance. Instructed to continue current medications, diet and exercise. Patient agreed with treatment plan. Follow up as directed.      Electronically signed by JANIS Kaplan on 11/19/2021 at 9:29 AM

## 2021-12-08 DIAGNOSIS — Z79.4 TYPE 2 DIABETES MELLITUS WITHOUT COMPLICATION, WITH LONG-TERM CURRENT USE OF INSULIN (HCC): ICD-10-CM

## 2021-12-08 DIAGNOSIS — E11.9 TYPE 2 DIABETES MELLITUS WITHOUT COMPLICATION, WITH LONG-TERM CURRENT USE OF INSULIN (HCC): ICD-10-CM

## 2021-12-14 DIAGNOSIS — E11.9 TYPE 2 DIABETES MELLITUS WITHOUT COMPLICATION, WITH LONG-TERM CURRENT USE OF INSULIN (HCC): ICD-10-CM

## 2021-12-14 DIAGNOSIS — Z79.4 TYPE 2 DIABETES MELLITUS WITHOUT COMPLICATION, WITH LONG-TERM CURRENT USE OF INSULIN (HCC): ICD-10-CM

## 2021-12-14 RX ORDER — CANAGLIFLOZIN 100 MG/1
TABLET, FILM COATED ORAL
Qty: 30 TABLET | Refills: 0 | Status: SHIPPED | OUTPATIENT
Start: 2021-12-14

## 2021-12-28 DIAGNOSIS — Z79.4 TYPE 2 DIABETES MELLITUS WITHOUT COMPLICATION, WITH LONG-TERM CURRENT USE OF INSULIN (HCC): ICD-10-CM

## 2021-12-28 DIAGNOSIS — E11.9 TYPE 2 DIABETES MELLITUS WITHOUT COMPLICATION, WITH LONG-TERM CURRENT USE OF INSULIN (HCC): ICD-10-CM

## 2021-12-30 DIAGNOSIS — Z12.11 SCREENING FOR COLON CANCER: ICD-10-CM

## 2022-01-11 ENCOUNTER — TELEPHONE (OUTPATIENT)
Dept: PRIMARY CARE CLINIC | Age: 52
End: 2022-01-11

## 2022-01-11 DIAGNOSIS — R05.9 COUGH: ICD-10-CM

## 2022-01-11 DIAGNOSIS — R09.81 CONGESTION OF NASAL SINUS: Primary | ICD-10-CM

## 2022-01-11 RX ORDER — AZITHROMYCIN 250 MG/1
TABLET, FILM COATED ORAL
Qty: 1 PACKET | Refills: 0 | Status: SHIPPED | OUTPATIENT
Start: 2022-01-11 | End: 2022-01-21

## 2022-01-11 NOTE — TELEPHONE ENCOUNTER
Covid dx 1/3/22, pt still has runny nose and congestion. He is asking for an abx.  Pharm [facility]

## 2022-08-29 LAB
AVERAGE GLUCOSE: 178
HBA1C MFR BLD: 7 %

## 2022-12-13 RX ORDER — BLOOD SUGAR DIAGNOSTIC
STRIP MISCELLANEOUS
Qty: 200 STRIP | Refills: 3 | Status: SHIPPED | OUTPATIENT
Start: 2022-12-13

## 2023-09-26 ENCOUNTER — INITIAL CONSULT (OUTPATIENT)
Dept: ONCOLOGY | Age: 53
End: 2023-09-26
Payer: COMMERCIAL

## 2023-09-26 ENCOUNTER — TELEPHONE (OUTPATIENT)
Dept: ONCOLOGY | Age: 53
End: 2023-09-26

## 2023-09-26 VITALS
WEIGHT: 252.1 LBS | HEART RATE: 73 BPM | TEMPERATURE: 96.8 F | RESPIRATION RATE: 18 BRPM | HEIGHT: 71 IN | SYSTOLIC BLOOD PRESSURE: 139 MMHG | BODY MASS INDEX: 35.29 KG/M2 | OXYGEN SATURATION: 98 % | DIASTOLIC BLOOD PRESSURE: 81 MMHG

## 2023-09-26 DIAGNOSIS — D69.6 THROMBOCYTOPENIA (HCC): Primary | ICD-10-CM

## 2023-09-26 PROCEDURE — 3078F DIAST BP <80 MM HG: CPT | Performed by: INTERNAL MEDICINE

## 2023-09-26 PROCEDURE — 3074F SYST BP LT 130 MM HG: CPT | Performed by: INTERNAL MEDICINE

## 2023-09-26 PROCEDURE — 99204 OFFICE O/P NEW MOD 45 MIN: CPT | Performed by: INTERNAL MEDICINE

## 2023-10-03 NOTE — PROGRESS NOTES
07/13/2023 1232        Component Value Date/Time    CALCIUM 9.0 07/13/2023 1232    ALKPHOS 45 07/13/2023 1232    ALKPHOS 59 08/23/2021 1125     (H) 07/13/2023 1232     (H) 07/13/2023 1232    BILITOT 0.9 07/13/2023 1232          REVIEW OF RADIOLOGICAL RESULTS:     IMPRESSION:   Chronic thrombocytopenia  Elevation of liver enzymes likely secondary to diabetes  DM2  Hypothyroidism  PLAN:   I had a long discussion with the patient. I suspect his thrombocytopenia is likely secondary to elevated liver enzymes from diabetes. Differential diagnoses include compensated ITP  MDS and primary hematologic disorder quite unlikely but not completely excluded  We will do a basic work-up including blood smear and repeated liver enzymes. We will evaluate him after reviewing of the basic work-up he might need a bone marrow aspiration or biopsy if IV high risk features evaluated on the smear     Iona Khan MD  Hematologist/Medical [address] [facility] [address] hematology oncology physicians      I spent a total of 40 minutes on the date of the service which included preparing to see the patient, face-to-face patient care, completing clinical documentation, obtaining and/or reviewing separately obtained history, performing a medically appropriate examination, counseling and educating the patient/family/caregiver, ordering medications, tests, or procedures, communicating with other HCPs (not separately reported), independently interpreting results (not separately reported), communicating results to the patient/family/caregiver and care coordination (not separately reported).

## 2023-10-26 ENCOUNTER — HOSPITAL ENCOUNTER (OUTPATIENT)
Dept: MRI IMAGING | Age: 53
Discharge: HOME | End: 2023-10-28
Payer: COMMERCIAL

## 2023-10-26 DIAGNOSIS — R97.20 ELEVATED PROSTATE SPECIFIC ANTIGEN (PSA): ICD-10-CM

## 2023-10-26 PROCEDURE — 2580000003 HC RX 258

## 2023-10-26 PROCEDURE — 6360000004 HC RX CONTRAST MEDICATION

## 2023-10-26 PROCEDURE — 72197 MRI PELVIS W/O & W/DYE: CPT

## 2023-10-26 PROCEDURE — A9579 GAD-BASE MR CONTRAST NOS,1ML: HCPCS

## 2023-10-26 RX ORDER — 0.9 % SODIUM CHLORIDE 0.9 %
50 INTRAVENOUS SOLUTION INTRAVENOUS ONCE
Status: CANCELLED | OUTPATIENT
Start: 2023-10-26 | End: 2023-10-26

## 2023-10-26 RX ORDER — 0.9 % SODIUM CHLORIDE 0.9 %
35 INTRAVENOUS SOLUTION INTRAVENOUS ONCE
Status: COMPLETED | OUTPATIENT
Start: 2023-10-26 | End: 2023-10-26

## 2023-10-26 RX ORDER — SODIUM CHLORIDE 0.9 % (FLUSH) 0.9 %
10 SYRINGE (ML) INJECTION 2 TIMES DAILY
Status: DISCONTINUED | OUTPATIENT
Start: 2023-10-26 | End: 2023-10-29 | Stop reason: HOSPADM

## 2023-10-26 RX ORDER — SODIUM CHLORIDE 0.9 % (FLUSH) 0.9 %
10 SYRINGE (ML) INJECTION 2 TIMES DAILY
Status: CANCELLED | OUTPATIENT
Start: 2023-10-26

## 2023-10-26 RX ADMIN — SODIUM CHLORIDE 35 ML: 9 INJECTION, SOLUTION INTRAVENOUS at 16:12

## 2023-10-26 RX ADMIN — GADOTERIDOL 20 ML: 279.3 INJECTION, SOLUTION INTRAVENOUS at 16:11

## 2023-11-06 ENCOUNTER — TELEPHONE (OUTPATIENT)
Dept: GASTROENTEROLOGY | Age: 53
End: 2023-11-06

## 2023-11-07 ENCOUNTER — TELEPHONE (OUTPATIENT)
Dept: ONCOLOGY | Age: 53
End: 2023-11-07

## 2023-11-07 ENCOUNTER — OFFICE VISIT (OUTPATIENT)
Dept: ONCOLOGY | Age: 53
End: 2023-11-07
Payer: COMMERCIAL

## 2023-11-07 ENCOUNTER — HOSPITAL ENCOUNTER (OUTPATIENT)
Age: 53
Discharge: HOME | End: 2023-11-07

## 2023-11-07 VITALS
HEART RATE: 74 BPM | TEMPERATURE: 96.8 F | SYSTOLIC BLOOD PRESSURE: 136 MMHG | BODY MASS INDEX: 36.04 KG/M2 | OXYGEN SATURATION: 97 % | WEIGHT: 258.4 LBS | RESPIRATION RATE: 18 BRPM | DIASTOLIC BLOOD PRESSURE: 87 MMHG

## 2023-11-07 DIAGNOSIS — D69.6 THROMBOCYTOPENIA (HCC): Primary | ICD-10-CM

## 2023-11-07 DIAGNOSIS — R74.8 ELEVATED LIVER ENZYMES: ICD-10-CM

## 2023-11-07 DIAGNOSIS — R97.20 ELEVATED PSA: ICD-10-CM

## 2023-11-07 DIAGNOSIS — D69.6 THROMBOCYTOPENIA (HCC): ICD-10-CM

## 2023-11-07 PROCEDURE — 3075F SYST BP GE 130 - 139MM HG: CPT | Performed by: INTERNAL MEDICINE

## 2023-11-07 PROCEDURE — 99213 OFFICE O/P EST LOW 20 MIN: CPT | Performed by: INTERNAL MEDICINE

## 2023-11-07 PROCEDURE — 3079F DIAST BP 80-89 MM HG: CPT | Performed by: INTERNAL MEDICINE

## 2023-11-07 PROCEDURE — 99211 OFF/OP EST MAY X REQ PHY/QHP: CPT | Performed by: INTERNAL MEDICINE

## 2023-11-07 RX ORDER — TADALAFIL 5 MG/1
TABLET ORAL
COMMUNITY
Start: 2023-11-06

## 2023-11-07 RX ORDER — INSULIN LISPRO-AABC 100 [IU]/ML
INJECTION, SOLUTION SUBCUTANEOUS
COMMUNITY
Start: 2023-08-11

## 2023-11-07 NOTE — TELEPHONE ENCOUNTER
AVS From 11/7/23      Rv in 6 months with cbc , cmp     Rv sched for 5/7 @ 330    Pt elected to access avs & schedule on [employer]    Electronically signed by Dez Mars on 11/7/2023 at 4:01 PM

## 2023-11-07 NOTE — PROGRESS NOTES
DIAGNOSIS:   Chronic thrombocytopenia  With comorbidities including hypothyroidism, liver disease secondary to diabetes, elevated PSA without a diagnosis of prostate cancer  CURRENT THERAPY:  Work-up in progress  BRIEF CASE HISTORY:   Cristin Carreon is a very pleasant 48 y.o. male who is referred to us for management recommendation regarding chronic thrombocytopenia. He follows with endocrinology due to diabetes. And hypothyroidism from Hashimoto's thyroiditis. The patient seems to be doing well overall and his diabetes is well controlled. He is sent to us for a consultation, denies any bleeding or bruising. INTERIM HISTORY  Patient seen and evaluated. Work-up suggested that his thrombocytopenia secondary to liver disease secondary to diabetes. Thrombocytopenia is mild. No primary hematologic disorder suggested. PAST MEDICAL HISTORY: has a past medical history of Anxiety, Depression, DM (diabetes mellitus) ([address]), ED (erectile dysfunction), Elevated ALT measurement, GERD (gastroesophageal reflux disease), HTN (hypertension), Hyperlipidemia, Hypothyroidism, Obesity, RAD (reactive airway disease), and Stage 2 chronic kidney disease. PAST SURGICAL HISTORY: has a past surgical history that includes Cardiac catheterization and Tonsillectomy. CURRENT MEDICATIONS:  has a current medication list which includes the following prescription(s): lyumjev tempo pen, tadalafil, accu-chek guide, accu-chek guide, accu-chek fastclix lancets, [facility] pen needles, blood glucose monitor kit and supplies, atenolol, b-d uf iii mini pen needles, insulin glargine, levothyroxine, and naproxen. ALLERGIES:  is allergic to antihistamines, loratadine-type [antihistamines, loratadine-type]; fenofibrate; and quinapril hcl. FAMILY HISTORY: Negative for any hematological or oncological conditions. SOCIAL HISTORY:  reports that he has never smoked. He has never used smokeless tobacco. He reports current alcohol use.  He

## 2024-02-19 ENCOUNTER — OFFICE VISIT (OUTPATIENT)
Dept: GASTROENTEROLOGY | Age: 54
End: 2024-02-19
Payer: COMMERCIAL

## 2024-02-19 VITALS
WEIGHT: 255.13 LBS | HEART RATE: 76 BPM | DIASTOLIC BLOOD PRESSURE: 86 MMHG | BODY MASS INDEX: 35.72 KG/M2 | TEMPERATURE: 98.2 F | HEIGHT: 71 IN | SYSTOLIC BLOOD PRESSURE: 138 MMHG

## 2024-02-19 DIAGNOSIS — K21.9 GASTROESOPHAGEAL REFLUX DISEASE, UNSPECIFIED WHETHER ESOPHAGITIS PRESENT: ICD-10-CM

## 2024-02-19 DIAGNOSIS — R79.89 ABNORMAL LFTS: Primary | ICD-10-CM

## 2024-02-19 DIAGNOSIS — Z12.11 ENCOUNTER FOR SCREENING COLONOSCOPY: ICD-10-CM

## 2024-02-19 PROCEDURE — 3079F DIAST BP 80-89 MM HG: CPT | Performed by: INTERNAL MEDICINE

## 2024-02-19 PROCEDURE — 99205 OFFICE O/P NEW HI 60 MIN: CPT | Performed by: INTERNAL MEDICINE

## 2024-02-19 PROCEDURE — 3075F SYST BP GE 130 - 139MM HG: CPT | Performed by: INTERNAL MEDICINE

## 2024-02-19 NOTE — PROGRESS NOTES
Reason for Referral:   Hussain Oh MD  [address]  [address]  [place] [address]    No chief complaint on file.          HISTORY OF PRESENT ILLNESS: Mr.Keith Dickinson is a 53 y.o. male with a pasthistory remarkable for diabetes, referred for evaluation of abnormal LFT's.  HE is referred for evaluation of elevated ALT/ AST. HE admits to drinking 3-4 beers everyday. Also admits to eating junk food especially while on vacations.  Also has c/o- heartburn for which he takes Pepcid which keeps his symptoms in control.    No c/o- nausea, vomiting, fever, loss of appetite, weight loss, bloating, bleeding IN, diarrhea, nocturnal diarrhea, tenesmus      Past Medical,Family, and Social History reviewed and does not contribute to the patient presentingcondition.     Ozzie garcia's PMH/PSH,SH,PSYCH Hx, MEDs, ALLERGIES, and ROS were all reviewed and updated in the appropriate sections.    PAST MEDICAL HISTORY:  Past Medical History:   Diagnosis Date    Anxiety 6/6/2013    Depression 6/6/2013    DM (diabetes mellitus) (HCC) 6/6/2013    ED (erectile dysfunction) 6/6/2013    Elevated ALT measurement 6/6/2013    GERD (gastroesophageal reflux disease) 6/6/2013    HTN (hypertension) 6/6/2013    Hyperlipidemia 6/6/2013    Hypothyroidism 6/6/2013    Obesity 6/6/2013    RAD (reactive airway disease) 6/6/2013    Stage 2 chronic kidney disease 3/26/2021       Past Surgical History:   Procedure Laterality Date    CARDIAC CATHETERIZATION      TONSILLECTOMY         CURRENT MEDICATIONS:    Current Outpatient Medications:     LYUMJEV TEMPO  UNIT/ML SOPN, INJECT HIGH SLIDING SCALE 60 UNITS 3 TIMES DAILY, Disp: , Rfl:     tadalafil (CIALIS) 5 MG tablet, , Disp: , Rfl:     ACCU-CHEK GUIDE strip, USE TO TEST 2 TIMES DAILY., Disp: 200 strip, Rfl: 3    naproxen (NAPROSYN) 500 MG tablet, TAKE 1 TABLET BY MOUTH TWICE A DAY WITH MEALS (Patient not taking: No sig reported), Disp: 60 tablet, Rfl: 1    Blood Glucose Monitoring Suppl

## 2024-02-20 RX ORDER — POLYETHYLENE GLYCOL 3350, SODIUM SULFATE ANHYDROUS, SODIUM BICARBONATE, SODIUM CHLORIDE, POTASSIUM CHLORIDE 236; 22.74; 6.74; 5.86; 2.97 G/4L; G/4L; G/4L; G/4L; G/4L
POWDER, FOR SOLUTION ORAL
Qty: 4000 ML | Refills: 0 | Status: SHIPPED | OUTPATIENT
Start: 2024-02-20

## 2024-02-22 ENCOUNTER — HOSPITAL ENCOUNTER (OUTPATIENT)
Age: 54
Discharge: HOME | End: 2024-02-22
Payer: COMMERCIAL

## 2024-02-22 DIAGNOSIS — R79.89 ABNORMAL LFTS: ICD-10-CM

## 2024-02-22 LAB
HBV SURFACE AG SERPL QL IA: NONREACTIVE
HCV AB SERPL QL IA: NONREACTIVE

## 2024-02-22 PROCEDURE — 86803 HEPATITIS C AB TEST: CPT

## 2024-02-22 PROCEDURE — 87340 HEPATITIS B SURFACE AG IA: CPT

## 2024-02-22 PROCEDURE — 83516 IMMUNOASSAY NONANTIBODY: CPT

## 2024-02-22 PROCEDURE — 36415 COLL VENOUS BLD VENIPUNCTURE: CPT

## 2024-02-24 LAB
MITOCHONDRIA M2 IGG SER-ACNC: 1.6 U/ML (ref 0–4)
SMOOTH MUSCLE ANTIBODY: 6 UNITS (ref 0–19)

## 2024-03-11 ENCOUNTER — HOSPITAL ENCOUNTER (OUTPATIENT)
Dept: PREADMISSION TESTING | Age: 54
Discharge: HOME | End: 2024-03-15

## 2024-03-11 VITALS — BODY MASS INDEX: 32.51 KG/M2 | HEIGHT: 72 IN | WEIGHT: 240 LBS

## 2024-03-11 NOTE — PROGRESS NOTES
Pre-op Instructions For Out-Patient Endoscopy Surgery    Medication Instructions:  Please stop herbs and any supplements now (includes vitamins and minerals).    Please contact your surgeon and prescribing physician for pre-op instructions for any blood thinners. ( Glenroy is not taking Naproxen)     If you have inhalers/aerosol treatments at home, please use them the morning of your surgery and bring the inhalers with you to the hospital.    Please take the following medications the morning of your surgery with a sip of water:    Atenolol and Levothyroxine    Surgery Instructions:  After midnight before surgery:  Do not eat or drink anything, including water, mints, gum, and hard candy.  You may brush your teeth without swallowing.  No smoking, chewing tobacco, or street drugs.    Please shower or bathe before surgery.       Please do not wear any cologne, lotion, powder, jewelry, piercings, perfume, makeup, nail polish, hair accessories, or hair spray on the day of surgery.  Wear loose comfortable clothing.    Leave your valuables at home but bring a payment source for any after-surgery prescriptions you plan to fill at [facility].  Bring a storage case for any glasses/contacts.    An adult who is responsible for you MUST drive you home and should be with you for the first 24 hours after surgery.     The Day of Surgery:  Arrive at [facility] Surgery Entrance at the time directed by your surgeon and check in at the desk.     If you have a living will or healthcare power of , please bring a copy.    You will be taken to the pre-op holding area where you will be prepared for surgery.  A physical assessment will be performed by a nurse practitioner or house officer.  Your IV will be started and you will meet your anesthesiologist.    When you go to surgery, your family will be directed to the surgical waiting room, where the doctor should speak with them after your surgery.    After

## 2024-03-21 NOTE — PRE-PROCEDURE INSTRUCTIONS
Have you received your Prep? Any questions with prep instructions? Y  Only Clear Liquid Diet day before.Y  Nothing to eat after midnight day before procedure.Y  Are you taking any blood thinners? If so, you need to Stop.N  Remove any jewelry and body piercings.Y  Do you wear glasses? If so, please bring a case to store them in.  Are you having any Covid symptoms?N  Do you have any new rashes, infections, etc. that we should be aware of?N  Do you have a ride home the day of surgery? It cannot be a cab or medical transportation.Y  Verify surgery time/date and what time to arrive at hospital.[phone]

## 2024-03-22 ENCOUNTER — ANESTHESIA EVENT (OUTPATIENT)
Dept: OPERATING ROOM | Age: 54
End: 2024-03-22
Payer: COMMERCIAL

## 2024-03-25 ENCOUNTER — HOSPITAL ENCOUNTER (OUTPATIENT)
Age: 54
Setting detail: OUTPATIENT SURGERY
Discharge: HOME | End: 2024-03-25
Attending: INTERNAL MEDICINE | Admitting: INTERNAL MEDICINE
Payer: COMMERCIAL

## 2024-03-25 ENCOUNTER — ANESTHESIA (OUTPATIENT)
Dept: OPERATING ROOM | Age: 54
End: 2024-03-25
Payer: COMMERCIAL

## 2024-03-25 VITALS
HEIGHT: 72 IN | SYSTOLIC BLOOD PRESSURE: 138 MMHG | TEMPERATURE: 98.4 F | HEART RATE: 71 BPM | OXYGEN SATURATION: 96 % | RESPIRATION RATE: 17 BRPM | BODY MASS INDEX: 33.86 KG/M2 | WEIGHT: 250 LBS | DIASTOLIC BLOOD PRESSURE: 91 MMHG

## 2024-03-25 DIAGNOSIS — K21.9 GASTROESOPHAGEAL REFLUX DISEASE, UNSPECIFIED WHETHER ESOPHAGITIS PRESENT: ICD-10-CM

## 2024-03-25 DIAGNOSIS — Z12.11 ENCOUNTER FOR SCREENING COLONOSCOPY: ICD-10-CM

## 2024-03-25 LAB
GLUCOSE BLD-MCNC: 176 MG/DL (ref 75–110)
GLUCOSE BLD-MCNC: 184 MG/DL (ref 75–110)

## 2024-03-25 PROCEDURE — 2580000003 HC RX 258: Performed by: ANESTHESIOLOGY

## 2024-03-25 PROCEDURE — 2500000003 HC RX 250 WO HCPCS

## 2024-03-25 PROCEDURE — 3700000001 HC ADD 15 MINUTES (ANESTHESIA): Performed by: INTERNAL MEDICINE

## 2024-03-25 PROCEDURE — 45378 DIAGNOSTIC COLONOSCOPY: CPT | Performed by: INTERNAL MEDICINE

## 2024-03-25 PROCEDURE — 43239 EGD BIOPSY SINGLE/MULTIPLE: CPT | Performed by: INTERNAL MEDICINE

## 2024-03-25 PROCEDURE — 2709999900 HC NON-CHARGEABLE SUPPLY: Performed by: INTERNAL MEDICINE

## 2024-03-25 PROCEDURE — 7100000011 HC PHASE II RECOVERY - ADDTL 15 MIN: Performed by: INTERNAL MEDICINE

## 2024-03-25 PROCEDURE — 3700000000 HC ANESTHESIA ATTENDED CARE: Performed by: INTERNAL MEDICINE

## 2024-03-25 PROCEDURE — 82947 ASSAY GLUCOSE BLOOD QUANT: CPT

## 2024-03-25 PROCEDURE — 7100000010 HC PHASE II RECOVERY - FIRST 15 MIN: Performed by: INTERNAL MEDICINE

## 2024-03-25 PROCEDURE — 6360000002 HC RX W HCPCS

## 2024-03-25 PROCEDURE — 88305 TISSUE EXAM BY PATHOLOGIST: CPT

## 2024-03-25 PROCEDURE — 3609027000 HC COLONOSCOPY: Performed by: INTERNAL MEDICINE

## 2024-03-25 PROCEDURE — 3609012400 HC EGD TRANSORAL BIOPSY SINGLE/MULTIPLE: Performed by: INTERNAL MEDICINE

## 2024-03-25 RX ORDER — SODIUM CHLORIDE 9 MG/ML
INJECTION, SOLUTION INTRAVENOUS CONTINUOUS
Status: DISCONTINUED | OUTPATIENT
Start: 2024-03-25 | End: 2024-03-25 | Stop reason: HOSPADM

## 2024-03-25 RX ORDER — PROPOFOL 10 MG/ML
INJECTION, EMULSION INTRAVENOUS PRN
Status: DISCONTINUED | OUTPATIENT
Start: 2024-03-25 | End: 2024-03-25 | Stop reason: SDUPTHER

## 2024-03-25 RX ORDER — SODIUM CHLORIDE 0.9 % (FLUSH) 0.9 %
5-40 SYRINGE (ML) INJECTION EVERY 12 HOURS SCHEDULED
Status: DISCONTINUED | OUTPATIENT
Start: 2024-03-25 | End: 2024-03-25 | Stop reason: HOSPADM

## 2024-03-25 RX ORDER — LIDOCAINE HYDROCHLORIDE 10 MG/ML
INJECTION, SOLUTION EPIDURAL; INFILTRATION; INTRACAUDAL; PERINEURAL PRN
Status: DISCONTINUED | OUTPATIENT
Start: 2024-03-25 | End: 2024-03-25 | Stop reason: SDUPTHER

## 2024-03-25 RX ORDER — SODIUM CHLORIDE 0.9 % (FLUSH) 0.9 %
5-40 SYRINGE (ML) INJECTION PRN
Status: DISCONTINUED | OUTPATIENT
Start: 2024-03-25 | End: 2024-03-25 | Stop reason: HOSPADM

## 2024-03-25 RX ORDER — LIDOCAINE HYDROCHLORIDE 10 MG/ML
1 INJECTION, SOLUTION EPIDURAL; INFILTRATION; INTRACAUDAL; PERINEURAL
Status: DISCONTINUED | OUTPATIENT
Start: 2024-03-25 | End: 2024-03-25 | Stop reason: HOSPADM

## 2024-03-25 RX ORDER — SODIUM CHLORIDE 9 MG/ML
INJECTION, SOLUTION INTRAVENOUS PRN
Status: DISCONTINUED | OUTPATIENT
Start: 2024-03-25 | End: 2024-03-25 | Stop reason: HOSPADM

## 2024-03-25 RX ADMIN — LIDOCAINE HYDROCHLORIDE 40 MG: 10 INJECTION, SOLUTION EPIDURAL; INFILTRATION; INTRACAUDAL; PERINEURAL at 14:08

## 2024-03-25 RX ADMIN — PROPOFOL 50 MG: 10 INJECTION, EMULSION INTRAVENOUS at 14:11

## 2024-03-25 RX ADMIN — PROPOFOL 50 MG: 10 INJECTION, EMULSION INTRAVENOUS at 14:09

## 2024-03-25 RX ADMIN — PROPOFOL 50 MG: 10 INJECTION, EMULSION INTRAVENOUS at 14:08

## 2024-03-25 RX ADMIN — SODIUM CHLORIDE: 9 INJECTION, SOLUTION INTRAVENOUS at 12:55

## 2024-03-25 RX ADMIN — PROPOFOL 125 MCG/KG/MIN: 10 INJECTION, EMULSION INTRAVENOUS at 14:10

## 2024-03-25 ASSESSMENT — ENCOUNTER SYMPTOMS
NAUSEA: 0
SHORTNESS OF BREATH: 0
SINUS PRESSURE: 0
ABDOMINAL PAIN: 0

## 2024-03-25 ASSESSMENT — PAIN - FUNCTIONAL ASSESSMENT
PAIN_FUNCTIONAL_ASSESSMENT: 0-10
PAIN_FUNCTIONAL_ASSESSMENT: 0-10

## 2024-03-25 NOTE — H&P
HISTORY and PHYSICAL  [facility]       NAME:  Glenroy Dickinson  MRN: [id number]   YOB: 1970   Date: 3/25/2024   Age: 53 y.o.  Gender: male       COMPLAINT AND PRESENT HISTORY:       Glenroy Dickinson is 53 y.o.,  male, will be having a   EGD ESOPHAGOGASTRODUODENOSCOPY, COLONOSCOPY.       Pt is being seen for hx of : Pre-Op Diagnosis Codes:     * Encounter for screening colonoscopy      * Gastroesophageal reflux disease, unspecified whether esophagitis present      Prior Colonoscopy  or  EGD was done before , this is the first for both procedures     Pt  denies  abdominal pain including bloating/gas. Denies any nausea or vomiting.       No changes in bowel habits.  No diarrhea, constipation, blood in stools, black tarry stools. Pt denies having hemorrhoids.     No changes in appetite and unintended weight loss.     No  heartburn, indigestion,  acid reflux.   Pt is taking Pepcid.  That is  managing and eating better.      Pt denies any dysphagia .     Family Hx of colon cancer in maternal grandfather.  No FH of  esophageal cancer.     Medical history reviewed:  Denies current chest pain/pressure/palpations. Pt reports no SOB.   No recent URI, fever or chills.     Patient has been NPO since midnight. No blood thinners.     Patient states  has completed this am and followed prescribed prep with clear outcome.      Patient denies any personal or family problems with anesthesia .    RECENT LABS, IMAGING AND TESTING     Lab Results   Component Value Date    WBC 4.32 07/13/2023    RBC 5.02 07/13/2023    HGB 15.7 07/13/2023    HCT 45.7 07/13/2023    MCV 91.0 07/13/2023    MCH 31.3 07/13/2023    MCHC 34.4 07/13/2023    RDW 13.1 08/23/2021     (L) 07/13/2023    MPV 8.4 08/23/2021        Lab Results   Component Value Date     07/13/2023    K 4.5 07/13/2023    CL 99 07/13/2023    CO2 32 (H) 07/13/2023    BUN 15 07/13/2023    CREATININE 0.73 07/13/2023    GLUCOSE 150 (H) 07/13/2023    CALCIUM

## 2024-03-25 NOTE — ANESTHESIA PRE PROCEDURE
Department of Anesthesiology  Preprocedure Note       Name:  Glenroy Dickinson   Age:  53 y.o.  :  1970                                          MRN:  [id number]         Date:  3/25/2024      Surgeon: Surgeon(s):  Alex Becker MD    Procedure: Procedure(s):  ESOPHAGOGASTRODUODENOSCOPY BIOPSY  COLORECTAL CANCER SCREENING, NOT HIGH RISK    Medications prior to admission:   Prior to Admission medications    Medication Sig Start Date End Date Taking? Authorizing Provider   polyethylene glycol (GOLYTELY) 236 g solution Please follow instructions given to you by your provider  Patient not taking: Reported on 3/11/2024 2/20/24   Alex Becker MD   LYUMJEV TEMPO  UNIT/ML SOPN INJECT HIGH SLIDING SCALE 60 UNITS 3 TIMES DAILY 23   Gino Samuel MD   tadalafil (CIALIS) 5 MG tablet  23   Gino Samuel MD   ACCU-CHEK GUIDE strip USE TO TEST 2 TIMES DAILY. 22   Himanshu Bailey PA   Blood Glucose Monitoring Suppl (ACCU-CHEK GUIDE) w/Device KIT USE TO TEST 2 TIMES DAILY 21   Gino Samuel MD   Accu-Chek FastClix Lancets MISC USE TO TEST 2 TIMES DAILY 21   Gino Samuel MD   Insulin Pen Needle (MEIJER PEN NEEDLES) 31G X 6 MM MISC 1 each by Does not apply route daily 21   Himanshu Bailey PA   blood glucose monitor kit and supplies Dispense sufficient amount for once daily testing frequency plus additional to accommodate PRN testing needs. Dispense all needed supplies to include: monitor, strips, lancing device, lancets, control solutions, alcohol swabs. 21   Himanshu Bailey PA   atenolol (TENORMIN) 50 MG tablet TAKE ONE TABLET BY MOUTH DAILY 17   Jayme Quintana MD   B-D UF III MINI PEN NEEDLES 31G X 5 MM MISC  1/12/15   Gino Samuel MD   Insulin Glargine (LANTUS SC) Inject 70 Units into the skin every morning Endo.    Jayme Larsen MD   levothyroxine (SYNTHROID) 200 MCG tablet Take 1 tablet by mouth

## 2024-03-25 NOTE — ANESTHESIA POSTPROCEDURE EVALUATION
Department of Anesthesiology  Postprocedure Note    Patient: Glenroy Dickinson  MRN: [id number]  YOB: 1970  Date of evaluation: 3/25/2024    Procedure Summary       Date: 03/25/24 Room / Location: [address]    Anesthesia Start: 1405 Anesthesia Stop: 1435    Procedures:       ESOPHAGOGASTRODUODENOSCOPY BIOPSY (Esophagus)      COLORECTAL CANCER SCREENING, NOT HIGH RISK Diagnosis:       Encounter for screening colonoscopy      Gastroesophageal reflux disease, unspecified whether esophagitis present      (Encounter for screening colonoscopy [Z12.11])      (Gastroesophageal reflux disease, unspecified whether esophagitis present [K21.9])    Surgeons: Alex Becker MD Responsible Provider: Kevin Abbott MD    Anesthesia Type: general, TIVA ASA Status: 3            Anesthesia Type: No value filed.    Anna Phase I:      Anna Phase II: Anna Score: 9    Anesthesia Post Evaluation    Comments: POST- ANESTHESIA EVALUATION       Pt Name: Glenroy Dickinson  MRN: [id number]  YOB: 1970  Date of evaluation: 3/25/2024  Time:  3:20 PM      BP (!) 150/81   Pulse 74   Temp 98.4 °F (36.9 °C) (Infrared)   Resp 15   Ht 1.829 m (6')   Wt 113.4 kg (250 lb)   SpO2 97%   BMI 33.91 kg/m²      Consciousness Level  Awake  Cardiopulmonary Status  Stable  Pain Adequately Treated YES  Nausea / Vomiting  NO  Adequate Hydration  YES  Anesthesia Related Complications NONE      Electronically signed by Kevin Abbott MD on 3/25/2024 at 3:20 PM           No notable events documented.

## 2024-03-25 NOTE — OP NOTE
PROCEDURE NOTE    DATE OF PROCEDURE: 3/25/2024     SURGEON: Alex Becker MD  Facility: [facility]  ASSISTANT: None  Anesthesia: Monitored anesthesia care  PREOPERATIVE DIAGNOSIS: GERD      Screening for colon cancer    Diagnosis:    POSTOPERATIVE DIAGNOSIS: As described below    OPERATION: Upper GI endoscopy with biopsy    Colonoscopy     ANESTHESIA: Moderate Sedation     ESTIMATED BLOOD LOSS: Less than 50 ml    COMPLICATIONS: None.     SPECIMENS:  Was Obtained: GE junction; gastric mucosa    HISTORY: The patient is a 53 y.o. year old male with history of above preop diagnosis.  I recommended esophagogastroduodenoscopy with possible biopsy and I explained the risk, benefits, expected outcome, and alternatives to the procedure.  Risks included but are not limited to bleeding, infection, respiratory distress, hypotension, and perforation of the esophagus, stomach, or duodenum.  Patient understands and is in agreement.      PROCEDURE: The patient was given IV conscious sedation.  The patient's SPO2 remained above 90% throughout the procedure.The gastroscope was inserted orally and advanced under direct vision through the esophagus, through the stomach, through the pylorus, and into the descending duodenum.      Post sedation note :The patient's SPO2 remained above 90% throughout the procedure.the vital signs remained stable , and no immediate complication form the procedure noted, patient will be ready for d/c when criteria is met .      Findings:    Retropharyngeal area was grossly normal appearing    Esophagus: abnormal: small tongue of salmon colored mucosa near GE junction, R/o- Resendiz's- bopsy obtained    Esophagogastric markings: Diaphragmatic hiatus- 40 cm; GE junction- 40 cm; Squamo-columnar junction- 40 cm    Stomach:    Fundus: normal    Body: normal    Antrum: abnormal: patchy erythema near pylorus  Biopsy obtained to r/o- H pylori infection     Duodenum:     Descending: normal    Bulb:

## 2024-03-25 NOTE — DISCHARGE INSTRUCTIONS
professional. [employer] disclaims any warranty or liability for your use of this information.  Content Version: 9.9.196695; Last Revised: February 20, 2013        Resendiz's Esophagus  Resendiz's esophagus occurs when the lining of the esophagus is damaged. The esophagus is the tube that carries food from the mouth to the stomach. With Resendiz's esophagus, the lining of the esophagus gets replaced by material that is similar to the lining in the intestines. This process is called intestinal metaplasia. A small number of people with Resendiz's esophagus develop esophageal cancer.  CAUSES   The exact cause of Resendiz's esophagus is unknown.  SYMPTOMS   Most people with Resendiz's esophagus do not have symptoms. However, many patients also have gastroesophageal reflux disease (GERD). GERD can cause heartburn, trouble swallowing, and a dry cough.  DIAGNOSIS  Resendiz's esophagus is diagnosed by an exam called upper gastrointestinal endoscopy. A thin, flexible tube (endoscope) is passed down the esophagus. The endoscope has a light and camera on the end. Your caregiver uses the endoscope to view the inside of the esophagus. A tissue sample may also be taken and examined under a microscope (biopsy). If cancer cells are found during the biopsy, this condition is called dysplasia.  TREATMENT   If you have no dysplasia or low-grade dysplasia, your caregiver may recommend no treatment or only taking medicines to treat GERD. Sometimes, taking acid-blocking drugs to treat GERD helps improve the tissue affected by Resendiz's esophagus. Your caregiver may also recommend periodic esophageal exams.  If you have high-grade dysplasia, treatment may include removing the damaged parts of the esophagus. This can be done by heating, freezing, or surgically removing the tissue. In some cases, surgery may be done to remove most of the esophagus. The stomach is then attached to the remaining portion of the esophagus.  HOME CARE

## 2024-03-26 NOTE — FLOWSHEET NOTE
EYES BECAME BLOTCHY LAST NIGHT THEN THIS MORNING BECAME DISCOLORED. DENIES PAIN OR VISUAL PROBLEMS

## 2024-03-26 NOTE — FLOWSHEET NOTE
States both eyes were blotchy last night then became discolored this morning. No pain or visual problems

## 2024-03-27 LAB — SURGICAL PATHOLOGY REPORT: NORMAL

## 2024-04-09 DIAGNOSIS — K21.9 GASTROESOPHAGEAL REFLUX DISEASE, UNSPECIFIED WHETHER ESOPHAGITIS PRESENT: ICD-10-CM

## 2024-04-11 ENCOUNTER — TELEPHONE (OUTPATIENT)
Dept: GASTROENTEROLOGY | Age: 54
End: 2024-04-11

## 2024-04-11 NOTE — TELEPHONE ENCOUNTER
Patient called requesting to reschedule upcoming appt 4/18 due to work schedule change, need afternoon. Pre service not able to accommodate. Please call to discuss , best time to reach him is morning 10a-11a.    Thank you

## 2025-01-03 ENCOUNTER — HOSPITAL ENCOUNTER (OUTPATIENT)
Age: 55
Discharge: HOME | End: 2025-01-03
Payer: COMMERCIAL

## 2025-01-03 LAB
ALBUMIN SERPL-MCNC: 4.1 G/DL (ref 3.5–5.2)
ALP SERPL-CCNC: 68 U/L (ref 40–129)
ALT SERPL-CCNC: 116 U/L (ref 10–50)
ANION GAP SERPL CALCULATED.3IONS-SCNC: 11 MMOL/L (ref 9–16)
AST SERPL-CCNC: 82 U/L (ref 10–50)
BILIRUB SERPL-MCNC: 0.2 MG/DL (ref 0–1.2)
BUN SERPL-MCNC: 10 MG/DL (ref 6–20)
CALCIUM SERPL-MCNC: 9.2 MG/DL (ref 8.6–10.4)
CHLORIDE SERPL-SCNC: 98 MMOL/L (ref 98–107)
CHOLEST SERPL-MCNC: 265 MG/DL (ref 0–199)
CHOLESTEROL/HDL RATIO: 6.8
CO2 SERPL-SCNC: 27 MMOL/L (ref 20–31)
CREAT SERPL-MCNC: 0.8 MG/DL (ref 0.7–1.2)
CREAT UR-MCNC: 142 MG/DL (ref 39–259)
GFR, ESTIMATED: >90 ML/MIN/1.73M2
GLUCOSE SERPL-MCNC: 226 MG/DL (ref 74–99)
HDLC SERPL-MCNC: 39 MG/DL
LDLC SERPL CALC-MCNC: 148 MG/DL (ref 0–100)
MICROALBUMIN UR-MCNC: <12 MG/L (ref 0–20)
MICROALBUMIN/CREAT UR-RTO: NORMAL MCG/MG CREAT (ref 0–17)
POTASSIUM SERPL-SCNC: 4.2 MMOL/L (ref 3.7–5.3)
PROT SERPL-MCNC: 7.1 G/DL (ref 6.6–8.7)
SODIUM SERPL-SCNC: 136 MMOL/L (ref 136–145)
T4 FREE SERPL-MCNC: 0.9 NG/DL (ref 0.9–1.7)
TRIGL SERPL-MCNC: 388 MG/DL (ref 0–149)
TSH SERPL DL<=0.05 MIU/L-ACNC: 9.93 UIU/ML (ref 0.27–4.2)

## 2025-01-03 PROCEDURE — 82043 UR ALBUMIN QUANTITATIVE: CPT

## 2025-01-03 PROCEDURE — 84443 ASSAY THYROID STIM HORMONE: CPT

## 2025-01-03 PROCEDURE — 36415 COLL VENOUS BLD VENIPUNCTURE: CPT

## 2025-01-03 PROCEDURE — 82570 ASSAY OF URINE CREATININE: CPT

## 2025-01-03 PROCEDURE — 80053 COMPREHEN METABOLIC PANEL: CPT

## 2025-01-03 PROCEDURE — 80061 LIPID PANEL: CPT

## 2025-01-03 PROCEDURE — 84439 ASSAY OF FREE THYROXINE: CPT

## 2025-04-09 ENCOUNTER — HOSPITAL ENCOUNTER (OUTPATIENT)
Age: 55
Discharge: HOME | End: 2025-04-09
Payer: COMMERCIAL

## 2025-04-09 LAB
ALT SERPL-CCNC: 156 U/L (ref 10–50)
CHOLEST SERPL-MCNC: 189 MG/DL (ref 0–199)
CHOLESTEROL/HDL RATIO: 5.1
HDLC SERPL-MCNC: 37 MG/DL
LDLC SERPL CALC-MCNC: 106 MG/DL (ref 0–100)
T4 FREE SERPL-MCNC: 1.7 NG/DL (ref 0.9–1.7)
TRIGL SERPL-MCNC: 229 MG/DL (ref 0–149)
TSH SERPL DL<=0.05 MIU/L-ACNC: 0.05 UIU/ML (ref 0.27–4.2)

## 2025-04-09 PROCEDURE — 84460 ALANINE AMINO (ALT) (SGPT): CPT

## 2025-04-09 PROCEDURE — 84443 ASSAY THYROID STIM HORMONE: CPT

## 2025-04-09 PROCEDURE — 80061 LIPID PANEL: CPT

## 2025-04-09 PROCEDURE — 36415 COLL VENOUS BLD VENIPUNCTURE: CPT

## 2025-04-09 PROCEDURE — 84439 ASSAY OF FREE THYROXINE: CPT

## 2025-07-03 ENCOUNTER — HOSPITAL ENCOUNTER (OUTPATIENT)
Age: 55
Discharge: HOME | End: 2025-07-03
Payer: COMMERCIAL

## 2025-07-03 LAB
T4 FREE SERPL-MCNC: 1.6 NG/DL (ref 0.9–1.7)
TSH SERPL DL<=0.05 MIU/L-ACNC: 0.17 UIU/ML (ref 0.27–4.2)

## 2025-07-03 PROCEDURE — 84439 ASSAY OF FREE THYROXINE: CPT

## 2025-07-03 PROCEDURE — 36415 COLL VENOUS BLD VENIPUNCTURE: CPT

## 2025-07-03 PROCEDURE — 84443 ASSAY THYROID STIM HORMONE: CPT

## 2025-07-05 ENCOUNTER — HOSPITAL ENCOUNTER (EMERGENCY)
Age: 55
Discharge: LEFT BEFORE DISCHARGE | End: 2025-07-05
Payer: COMMERCIAL

## 2025-07-05 LAB — GLUCOSE BLD-MCNC: 134 MG/DL (ref 74–100)

## 2025-07-05 PROCEDURE — 82947 ASSAY GLUCOSE BLOOD QUANT: CPT

## 2025-07-05 PROCEDURE — 4500000002 HC ER NO CHARGE

## 2025-07-07 ENCOUNTER — RESULTS FOLLOW-UP (OUTPATIENT)
Dept: PRIMARY CARE CLINIC | Age: 55
End: 2025-07-07

## (undated) DEVICE — ENDO KIT W/SYRINGE: Brand: MEDLINE INDUSTRIES, INC.

## (undated) DEVICE — BITEBLOCK 54FR W/ DENT RIM BLOX

## (undated) DEVICE — FORCEPS BX L240CM JAW DIA2.4MM ORNG L CAP W/ NDL DISP RAD

## (undated) DEVICE — DEFENDO AIR WATER SUCTION AND BIOPSY VALVE KIT FOR  OLYMPUS: Brand: DEFENDO AIR/WATER/SUCTION AND BIOPSY VALVE